# Patient Record
Sex: FEMALE | URBAN - METROPOLITAN AREA
[De-identification: names, ages, dates, MRNs, and addresses within clinical notes are randomized per-mention and may not be internally consistent; named-entity substitution may affect disease eponyms.]

---
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
## 2018-01-17 DIAGNOSIS — F31.9 BIPOLAR I DISORDER (H): ICD-10-CM

## 2018-01-17 RX ORDER — QUETIAPINE FUMARATE 50 MG/1
50 TABLET, EXTENDED RELEASE ORAL AT BEDTIME
Qty: 30 EACH | Refills: 0 | Status: SHIPPED | OUTPATIENT
Start: 2018-01-17 | End: 2018-01-19

## 2018-01-17 RX ORDER — QUETIAPINE 200 MG/1
200 TABLET, FILM COATED, EXTENDED RELEASE ORAL AT BEDTIME
Qty: 30 TABLET | Refills: 0 | Status: SHIPPED | OUTPATIENT
Start: 2018-01-17 | End: 2018-01-19

## 2018-01-17 NOTE — TELEPHONE ENCOUNTER
Miri Garner Michelle, RN        Phone Number: [phone]                     Caller:  Zoe   Medication:  200 mg and 50 mg Seroquel   Pharmacy and location:  [facility] in [place]   Have you contacted the pharmacy: yes   How much of medication do you have left:  out   Pending appt: 1/19   Okay to leave VM:  yes              Miri Mina Michelle, RN        Phone Number: [phone]                     Caller:  MotherChloe   Medication:  Seroquel 250 mg   Pharmacy and location:  [facility] [place]   Have you contacted the pharmacy: yes - pharmacy states they sent refill request on 1/13 and 1/16 with no answer   How much of medication do you have left:  Out since 1/13   Pending appt: 1/19   Okay to leave VM:  Yes - on voicemail only     Per patient's mother, patient has been taking 250 mg dose, not 200 mg.       Will notify provider that pt has been out of med since 1/13/18, for instructions on dosing.

## 2018-01-17 NOTE — TELEPHONE ENCOUNTER
Bk Kaur Michelle, RN        Phone Number: [phone]                     Pharmacy calling and location: [facility] [place]   Name of person calling: [place]   Medication: Quetiapine 50mg and 200mg ER   Reason: Checking on status     Pt has been out of medication for 2 days       Leopoldo Miri E  Catherine Guillermo, RN        Phone Number: [phone]                     Zoe Wilcox's mother is calling again to check the status.  She will expect a call back by 2 PM or will call again.  She is upset about the delay given the patient has been out of the medication.  She said that she will call again if she has not heard anything by 2 PM.       Jennifer Us Michelle, RN        Phone Number: [phone]                     Caller:  pt   Medication:  Seroquel 250mg   Pharmacy and location:  [facility] in [place]   Have you contacted the pharmacy: Yeah   How much of medication do you have left:  Pt is on third day without medication   Pending appt: 1/19/18 at 8:45am   Okay to leave VM:  [phone]; yes ok to lvm       As pt cancelled most recent appt, need provider authorization to refill.  Also there is a dosage discrepancy in last office note stating that Seroquel XR dose is 200 mg daily.  Lastly, pt reports being off med x 3 days, need provider direction in case titration is needed.    Text-paged Dr. Montiel

## 2018-01-17 NOTE — TELEPHONE ENCOUNTER
Quetipine 50 mg  Last reflled 12/11/17  Qty #30    Medication requested: Quetiapine 200 mg  Last refilled: 12/11/17  Qty: 30    Last seen: 9/1/17  RTC: 2 months  Cancel: 0  No-show: 0  Next appt: 1/19/18    Refill decision: Refill pended and routed to the provider for review/determination due to confusion re: appropriate dose, pt was to return in 1 month.   Discussed possibility of continuing to decrease patient's Seroquel dose, but patient states that she recently filled the 250 mg tabs and would like to stay on the current dose and consider decreasing the dose to 200 mg at our next visit in 1 month  1) PSYCHOTROPIC MEDICATIONS:  - Continue lithium 900 mg qhs  - Continue Seroquel 200 mg qhs

## 2018-01-17 NOTE — TELEPHONE ENCOUNTER
Writer rec'd immediate call back from Dr. Montiel who has refilled Seroquel at 250 mg QD.  States that pt has tolerated med well so can restart at full dose- 250 mg daily.      Writer returned call to pt.  Discussed that this medication refill went initially to refill team and they were unable to refill due to dose discrepancy in last office note and due to cancelled appt.  Writer also states wanted provider approval to have pt restart at 250 mg daily vs titrate, after several missed doses.  Pt agrees that she has tolerated med and plans to restart at 250 mg daily.  Confirms that she will make upcoming appt this week.  Denies any further questions.

## 2018-01-19 ENCOUNTER — OFFICE VISIT (OUTPATIENT)
Dept: PSYCHIATRY | Facility: CLINIC | Age: 18
End: 2018-01-19
Attending: PSYCHIATRY & NEUROLOGY
Payer: COMMERCIAL

## 2018-01-19 ENCOUNTER — TELEPHONE (OUTPATIENT)
Dept: PSYCHIATRY | Facility: CLINIC | Age: 18
End: 2018-01-19

## 2018-01-19 VITALS
DIASTOLIC BLOOD PRESSURE: 79 MMHG | BODY MASS INDEX: 24.98 KG/M2 | WEIGHT: 132.2 LBS | SYSTOLIC BLOOD PRESSURE: 129 MMHG | HEART RATE: 87 BPM

## 2018-01-19 DIAGNOSIS — F31.9 BIPOLAR I DISORDER (H): Primary | ICD-10-CM

## 2018-01-19 LAB
BASOPHILS # BLD AUTO: 0.1 10E9/L (ref 0–0.2)
BASOPHILS NFR BLD AUTO: 0.7 %
DIFFERENTIAL METHOD BLD: NORMAL
EOSINOPHIL # BLD AUTO: 0.2 10E9/L (ref 0–0.7)
EOSINOPHIL NFR BLD AUTO: 2.2 %
ERYTHROCYTE [DISTWIDTH] IN BLOOD BY AUTOMATED COUNT: 12.9 % (ref 10–15)
HCT VFR BLD AUTO: 37.7 % (ref 35–47)
HGB BLD-MCNC: 12.4 G/DL (ref 11.7–15.7)
IMM GRANULOCYTES # BLD: 0 10E9/L (ref 0–0.4)
IMM GRANULOCYTES NFR BLD: 0.2 %
LITHIUM SERPL-SCNC: 0.89 MMOL/L (ref 0.6–1.2)
LYMPHOCYTES # BLD AUTO: 3 10E9/L (ref 0.8–5.3)
LYMPHOCYTES NFR BLD AUTO: 33.8 %
MCH RBC QN AUTO: 29.9 PG (ref 26.5–33)
MCHC RBC AUTO-ENTMCNC: 32.9 G/DL (ref 31.5–36.5)
MCV RBC AUTO: 91 FL (ref 78–100)
MONOCYTES # BLD AUTO: 0.7 10E9/L (ref 0–1.3)
MONOCYTES NFR BLD AUTO: 8 %
NEUTROPHILS # BLD AUTO: 4.8 10E9/L (ref 1.6–8.3)
NEUTROPHILS NFR BLD AUTO: 55.1 %
NRBC # BLD AUTO: 0 10*3/UL
NRBC BLD AUTO-RTO: 0 /100
PLATELET # BLD AUTO: 360 10E9/L (ref 150–450)
RBC # BLD AUTO: 4.15 10E12/L (ref 3.8–5.2)
TSH SERPL DL<=0.005 MIU/L-ACNC: 2.68 MU/L (ref 0.4–4)
WBC # BLD AUTO: 8.8 10E9/L (ref 4–11)

## 2018-01-19 PROCEDURE — 85025 COMPLETE CBC W/AUTO DIFF WBC: CPT | Performed by: STUDENT IN AN ORGANIZED HEALTH CARE EDUCATION/TRAINING PROGRAM

## 2018-01-19 PROCEDURE — 36415 COLL VENOUS BLD VENIPUNCTURE: CPT | Performed by: STUDENT IN AN ORGANIZED HEALTH CARE EDUCATION/TRAINING PROGRAM

## 2018-01-19 PROCEDURE — 84443 ASSAY THYROID STIM HORMONE: CPT | Performed by: STUDENT IN AN ORGANIZED HEALTH CARE EDUCATION/TRAINING PROGRAM

## 2018-01-19 PROCEDURE — G0463 HOSPITAL OUTPT CLINIC VISIT: HCPCS | Mod: ZF

## 2018-01-19 PROCEDURE — 80178 ASSAY OF LITHIUM: CPT | Performed by: STUDENT IN AN ORGANIZED HEALTH CARE EDUCATION/TRAINING PROGRAM

## 2018-01-19 RX ORDER — LITHIUM CARBONATE 300 MG/1
900 CAPSULE ORAL AT BEDTIME
Qty: 90 CAPSULE | Refills: 2 | Status: SHIPPED | OUTPATIENT
Start: 2018-01-19 | End: 2018-04-17

## 2018-01-19 RX ORDER — QUETIAPINE 200 MG/1
200 TABLET, FILM COATED, EXTENDED RELEASE ORAL AT BEDTIME
Qty: 30 TABLET | Refills: 2 | Status: SHIPPED | OUTPATIENT
Start: 2018-01-19 | End: 2018-04-17

## 2018-01-19 ASSESSMENT — PAIN SCALES - GENERAL: PAINLEVEL: NO PAIN (0)

## 2018-01-19 ASSESSMENT — PATIENT HEALTH QUESTIONNAIRE - PHQ9: SUM OF ALL RESPONSES TO PHQ QUESTIONS 1-9: 0

## 2018-01-19 NOTE — NURSING NOTE
Chief Complaint   Patient presents with     Recheck Medication     Bipolar I disorder     Reviewed allergies, medications, pharmacy and smoking status.  Administered abuse screening questions     Obtained weight, pain level, blood pressure and heart rate

## 2018-01-19 NOTE — MR AVS SNAPSHOT
After Visit Summary   1/19/2018    Zoe Altman    MRN: [id number]           Patient Information     Date Of Birth          2000        Visit Information        Provider Department      1/19/2018 8:45 AM Lupis Montiel, DO Psychiatry Clinic        Today's Diagnoses     Bipolar I disorder (H)    -  1      Care Instructions    1) Take Seroquel 200 mg at bedtime  2) Continue lithium 900 mg qhs for now, I will contact you by phone about what your lithium level is and instruct you further on any adjustments  3) Get fasting cholesterol panel, CMP and HgA1c for blood sugar at any [place] location.  4) Return to clinic Friday, April 6th at 10:45 am.  Call if you need to reschedule    Thank you for coming to the PSYCHIATRY CLINIC.    Lab Testing:  If you had lab testing today and your results are reassuring or normal they will be mailed to you or sent through [employer] within 7 days.   If the lab tests need quick action we will call you with the results.  The phone number we will call with results is # [phone] (home) . If this is not the best number please call our clinic and change the number.    Medication Refills:  If you need any refills please call your pharmacy and they will contact us. Our fax number for refills is [phone]. Please allow three business for refill processing.   If you need to  your refill at a new pharmacy, please contact the new pharmacy directly. The new pharmacy will help you get your medications transferred.     Scheduling:  If you have any concerns about today's visit or wish to schedule another appointment please call our office during normal business hours [phone] (8-5:00 M-F)    Contact Us:  Please call [phone] during business hours (8-5:00 M-F).  If after clinic hours, or on the weekend, please call  [phone].    Financial Assistance [phone]  [facility] Billing [phone]  [place] Billing [phone]  Medical Records  [phone]      MENTAL HEALTH CRISIS NUMBERS:  [facility]:   [facility] - [phone]   Crisis Residence [facility] - [phone]   Walk-In Counseling Center [facility] - [phone]   COPE 24/7 [place] Mobile Team for Adults - [[phone]]; Child - [[phone]]     Crisis Connection - [phone]     [facility] - [phone]   Walk-in counseling [facility] - [phone]   Walk-in counseling [facility] - [phone]   Crisis Residence [facility] - [phone]   Urgent Care Adult Mental Health:   --Drop-in, 24/7 crisis line, and [facility] [place] Mobile Team [[phone]]    CRISIS TEXT LINE: Text [phone] from anywhere, anytime, any crisis 24/7;    OR SEE www.crisistextline.org     Poison Control Center - [phone]    CHILD: [facility] needs assessment team - [phone]     [facility] Lifeline - [phone]; or [place] Lifeline - [phone]    If you have a medical emergency please call 911or go to the nearest ER.                    _____________________________________________    Again thank you for choosing PSYCHIATRY CLINIC and please let us know how we can best partner with you to improve you and your family's health.  You may be receiving a survey in the mail regarding this appointment. We would love to have your feedback, both positive and negative, so please fill out the survey and return it using the provided envolpe. The survey is done by an external company, so your answers are anonymous.             Follow-ups after your visit        Your next 10 appointments already scheduled     Apr 06, 2018 10:45 AM CDT   First Episode Return with Lupis Montiel,    Psychiatry Clinic ([facility])    [address] [address] [facility] [address] [phone]              Who to contact     Please call your clinic at  [phone] to:    Ask questions about your health    Make or cancel appointments    Discuss your medicines    Learn about your test results    Speak to your doctor   If you have compliments or concerns about an experience at your clinic, or if you wish to file a complaint, please contact [facility] Patient Relations at [phone] or email us at [email or web address]         Additional Information About Your Visit        [employer] Information     [employer] is an electronic gateway that provides easy, online access to your medical records. With [employer], you can request a clinic appointment, read your test results, renew a prescription or communicate with your care team.     To sign up for [employer] visit the website at [email or web address]   You will be asked to enter the access code listed below, as well as some personal information. Please follow the directions to create your username and password.     Your access code is: [email or web address]  Expires: 2018 10:15 AM     Your access code will  in 90 days. If you need help or a new code, please contact your [facility] Clinic or call [phone] for assistance.        Care EveryWhere ID     This is your Care EveryWhere ID. This could be used by other organizations to access your [place] medical records  [id number]        Your Vitals Were     Pulse BMI (Body Mass Index)                87 24.98 kg/m2           Blood Pressure from Last 3 Encounters:   18 129/79   17 130/88   17 126/83    Weight from Last 3 Encounters:   18 60 kg (132 lb 3.2 oz) (60 %)*   17 57.8 kg (127 lb 6.4 oz) (53 %)*   17 56.4 kg (124 lb 6.4 oz) (48 %)*     * Growth percentiles are based on CDC 2-20 Years data.              We Performed the Following     CBC with Platelets Differential ([facility])     [place] level     TSH with free T4 reflex          Today's Medication Changes          These  changes are accurate as of: 1/19/18 10:15 AM.  If you have any questions, ask your nurse or doctor.               These medicines have changed or have updated prescriptions.        Dose/Directions    QUEtiapine 200 MG 24 hr tablet   Commonly known as:  SEROquel XR   This may have changed:    - additional instructions  - Another medication with the same name was removed. Continue taking this medication, and follow the directions you see here.   Used for:  Bipolar I disorder (H)   Changed by:  Lupis Montiel DO        Dose:  200 mg   Take 1 tablet (200 mg) by mouth At Bedtime   Quantity:  30 tablet   Refills:  2            Where to get your medicines      These medications were sent to [facility] [address] [place] [address]     Phone:  [phone]     lithium 300 MG capsule    QUEtiapine 200 MG 24 hr tablet                Primary Care Provider Office Phone # Fax #    Cortez FOLEY Cristian [phone]       [address] [place] [address]        Equal Access to Services     DOLROES CUMMINGS AH: Hadii benito ku hadasho Soomaali, waaxda luqadaha, qaybta kaalmada adeegyada, waxselina cortez hayld clay . So [facility] [phone].    ATENCIÓN: Si habla español, tiene a ashton disposición servicios gratuitos de asistencia lingüística. Llame al [phone].    We comply with applicable federal civil rights laws and [place] laws. We do not discriminate on the basis of race, color, national origin, age, disability, sex, sexual orientation, or gender identity.            Thank you!     Thank you for choosing PSYCHIATRY CLINIC  for your care. Our goal is always to provide you with excellent care. Hearing back from our patients is one way we can continue to improve our services. Please take a few minutes to complete the written survey that you may receive in the mail after your visit with us. Thank you!             Your Updated  Medication List - Protect others around you: Learn how to safely use, store and throw away your medicines at www.disposemymeds.org.          This list is accurate as of: 1/19/18 10:15 AM.  Always use your most recent med list.                   Brand Name Dispense Instructions for use Diagnosis    lithium 300 MG capsule     90 capsule    Take 3 capsules (900 mg) by mouth At Bedtime    Bipolar I disorder (H)       norgestimate-ethinyl estradiol 0.25-35 MG-MCG per tablet    ORTHO-CYCLEN, SPRINTEC     Take 1 tablet by mouth daily        OMEGA-3 FISH OIL PO      Take 1,000 mg by mouth daily        QUEtiapine 200 MG 24 hr tablet    SEROquel XR    30 tablet    Take 1 tablet (200 mg) by mouth At Bedtime    Bipolar I disorder (H)       WOMENS MULTIVITAMIN PLUS PO

## 2018-01-19 NOTE — PROGRESS NOTES
PSYCHIATRY CLINIC FIRST EPISODE PSYCHOSIS PROGRESS NOTE     INTERIM HISTORY:  Zoe Altman is a 17 year old female who was last seen in clinic on 9/1/2017 at which time her lithium was continued at 900 mg and Seroquel at 200 mg qhs.           The patient reports good treatment adherence.  History was provided by patient who was a good historian.  Since the last visit:  -Patient arrived to the appointment 15 minutes late today.  We spent time updating her telephone number in the Epic system.  -Patient reports that the semester went well overall and is starting classes again next week.  She states that her grades weren't as high as she would like, but plans to study harder this semester to improve them.  -States that her mood has been stable and feels that she is tolerating her medications well without side effects.  -She does report that she is currently taking Seroquel 250 mg qhs but would like to decrease to 200 mg qhs today.    -States that work is going well and that she is getting along well with her siblings at home.  -States that she eventually would like to save up money to be able to afford her own apartment.    PSYCH ROS:   HARRISON/HYPOMANIA:  reports-none;  DENIES- increased energy, decreased sleep need, increased activity and grandiosity  PSYCHOSIS:  reports-none;  DENIES- delusions, auditory hallucinations and visual hallucinations    RECENT SUBSTANCE USE:     ALCOHOL- none          TOBACCO- none          CAFFEINE- 2-3 caffeinated beverages a week             CANNABIS- none        OPIOIDS- none            NARCAN KIT- No     OTHER ILLICIT DRUGS- none      MEDICAL ROS:  Reports none.  Denies weight gain, sedation, akathisia, unusual movements, wt gain, polydipsia, polyphagia, Parkinsonian-type symptoms [shuffling gait, masked facies, stooped posture, speech change, writing change], excessive water intake, unexpalined fever and sialorrhea and polydipsia, polyuria, nocturia and wt gain    SOCIAL HISTORY                                     Social Engagement- currently casually dating  Living Situation- living at home with 3 of her 6 siblings and mom and step-dad    Children- 0  Financial Support- working.    Educational Functioning- graduated high school this year  Feels Safe at Home- Yes.     PSYCH and CD Critical Summary Points since July 2017           Initially started on Seroquel  mg qhs, Seroquel 50 mg TID PRN for psychosis/agitation, Ativan 0.5 mg qhs during her inpatient hospitalization at [facility] 6/21/16-7/19/2016.  Started lithium 5/30/17 and has tolerated well at dose of 900 mg qhs and has continued a cross taper of Seroquel from 400 mg to now 250 mg qhs    MEDICAL / SURGICAL HISTORY                                 Review of patient's allergies indicates no known allergies.    Patient Active Problem List   Diagnosis     Bipolar I disorder (H)       MEDICATIONS                               Current Outpatient Prescriptions   Medication Sig Dispense Refill     QUEtiapine (SEROQUEL XR) 200 MG 24 hr tablet Take 1 tablet (200 mg) by mouth At Bedtime (Take with 50 mg tab for total of 250 mg qhs) 30 tablet 0     QUEtiapine (SEROQUEL XR) 50 MG TB24 24 hr tablet Take 1 tablet (50 mg) by mouth At Bedtime (Take with 200 mg tab for total of 250 mg qhs) 30 each 0     lithium 300 MG capsule Take 3 capsules (900 mg) by mouth At Bedtime 90 capsule 3     norgestimate-ethinyl estradiol (ORTHO-CYCLEN, SPRINTEC) 0.25-35 MG-MCG per tablet Take 1 tablet by mouth daily       Omega-3 Fatty Acids (OMEGA-3 FISH OIL PO) Take 1,000 mg by mouth daily       Multiple Vitamins-Minerals (WOMENS MULTIVITAMIN PLUS PO)        VITALS   /79  Pulse 87  Wt 60 kg (132 lb 3.2 oz)  BMI 24.98 kg/m2      Wt Readings from Last 4 Encounters:   01/19/18 60 kg (132 lb 3.2 oz) (60 %)*   09/01/17 57.8 kg (127 lb 6.4 oz) (53 %)*   07/14/17 56.4 kg (124 lb 6.4 oz) (48 %)*   05/30/17 56.3 kg (124 lb 3.2 oz) (48 %)*     * Growth percentiles are  based on [facility] 2-20 Years data.       MENTAL STATUS EXAM                                                             Alertness: alert  and oriented  Appearance: well groomed  Behavior/Demeanor: cooperative, pleasant and calm, with good  eye contact   Speech: normal and regular rate and rhythm  Language: intact  Psychomotor: normal or unremarkable  Mood: description consistent with euthymia  Affect: appropriate; was congruent to mood; was congruent to content  Thought Process/Associations: unremarkable  Thought Content:  Reports none;  Denies suicidal ideation , violent ideation  and psychotic thoughts   Perception:  Reports none;  Denies auditory hallucinations and visual hallucinations  Insight: good  Judgment: good  Cognition: does  appear grossly intact; formal cognitive testing was not done    LABS and DATA     RATING SCALES:  AIMS: determine next due at next visit, last score 3/14/17: 0    PHQ9 TODAY = 0  PHQ-9 SCORE 4/25/2017 5/30/2017 7/14/2017   Total Score 0 0 0       LITHIUM LABS     [level, renal, SG, TSH, WBC]    q6 mo  Recent Labs   Lab Test  06/14/17   1137  06/13/17   0930  06/08/17   0812   LITHIUM  0.7  1.1  0.4*     Recent Labs   Lab Test  06/08/17   0812   CR  0.84   GFRESTIMATED  88   NA  140   DERIC  9.1     No lab results found.  Recent Labs   Lab Test  06/08/17   0812   TSH  2.26     Recent Labs   Lab Test  06/08/17   0812   WBC  7.8   ANEU  3.4       ANTIPSYCHOTIC LABS ROUTINE    [glu, A1C, lipids (focus LDL), liver enzymes, WBC, ANEU, Hgb, plts]   q12 mo  Recent Labs   Lab Test  06/08/17   0812   GLC  73   A1C  5.3     Recent Labs   Lab Test  06/08/17   0812   CHOL  245*   TRIG  94*   LDL  139*   HDL  87     Recent Labs   Lab Test  06/08/17   0812   AST  16   ALT  17   ALKPHOS  57     Recent Labs   Lab Test  06/08/17   0812   WBC  7.8   ANEU  3.4   HGB  14.3   PLT  313       DIAGNOSIS     Bipolar I Disorder  ASSESSMENT                                     TODAY   1) Bipolar Disorder- Patient  reports that she is tolerating the cross-taper from Seroquel to lithium well.  Her lithium level is within a therapeutic range and she feels that her symptoms are adequately controlled.  Patient is reportedly taking Seroquel 250 mg qhs and now plans to decrease it to 200 mg qhs.      2) Labs- Patient had elevated cholesterol, LDL, and triglycerides in 6/2017.  This may improve as patient tapers and possibly discontinue Seroquel.  Today patient will complete CBC, TSH, and lithium level.  Patient will complete fasting HgA1c, CMP, and cholesterol panel at next visit.  I will contact patient regarding lithium level and to discuss whether any dosing adjustments need to be made     PSYCHOTROPIC DRUG INTERACTIONS:   None.  MANAGEMENT:  N/A        PLAN                                                                                                       1) PSYCHOTROPIC MEDICATIONS:  - Decrease Seroquel from 250 mg to 200 mg qhs  - Continue lithium 900 mg qhs      2) THERAPY:  See plan below  TREATMENT PLAN: Date revised  -  1/19/18                                     Date next due- 4/19/18    3) NEXT DUE:    Labs- CBC, TSH, lithium level today.  HgA1c, CMP, and cholesterol fasting at any [place] location  Rating Scales- AIMS- next visit    4) REFERRALS:    No Referrals needed    5) RTC: 4/6/18    6) CRISIS NUMBERS:   Provided routinely in AVS.  Especially emphasized:  none    PSYCHIATRY CLINIC INDIVIDUAL PSYCHOTHERAPY NOTE   Length of session: Time started: 10:00 am Time ended: 10:17 am   continuing care in the Psych Clinic    Subjective: This supportive psychotherapy session addressed issues related to orientation to therapy , and school .  Patient's reaction: Maintenance  in the context of mental status appropriate for ambulatory setting.  Progress: good     Plan: Review in 3 months at next visit  See above    Psychotherapy services during this visit included  myself and Zoe Altman  TREATMENT  PLAN           SYMPTOMS;PROBLEMS   MEASURABLE GOALS;    FUNCTIONAL IMPROVEMENT INTERVENTIONS;    GAINS MADE DISCHARGE CRITERIA   Carrie/Hypomania: none   reduce manic/hypomanic episodes     Depression: insomnia   aiming for 8 hours of sleep     Psychosocial: saving money for independent housing   working part time and going to school     TREATMENT RISK STATEMENT:  The risks, benefits, alternatives and potential adverse effects have been discussed and are understood by the pt. The pt understands the risks of using street drugs or alcohol.  There are no medical contraindications, the pt agrees to treatment with the ability to do so.  The pt understands to call 911/ come to the nearest ED if life threatening or urgent symptoms present.        PROVIDER:  Lupis Montiel, DO    I did not see this pt directly. I have reviewed the documentation, and I agree with the resident's plan of care.    Deepali Fored

## 2018-01-19 NOTE — PATIENT INSTRUCTIONS
1) Take Seroquel 200 mg at bedtime  2) Continue lithium 900 mg qhs for now, I will contact you by phone about what your lithium level is and instruct you further on any adjustments  3) Get fasting cholesterol panel, CMP and HgA1c for blood sugar at any [place] location.  4) Return to clinic Friday, April 6th at 10:45 am.  Call if you need to reschedule    Thank you for coming to the PSYCHIATRY CLINIC.    Lab Testing:  If you had lab testing today and your results are reassuring or normal they will be mailed to you or sent through [employer] within 7 days.   If the lab tests need quick action we will call you with the results.  The phone number we will call with results is # [phone] (home) . If this is not the best number please call our clinic and change the number.    Medication Refills:  If you need any refills please call your pharmacy and they will contact us. Our fax number for refills is [phone]. Please allow three business for refill processing.   If you need to  your refill at a new pharmacy, please contact the new pharmacy directly. The new pharmacy will help you get your medications transferred.     Scheduling:  If you have any concerns about today's visit or wish to schedule another appointment please call our office during normal business hours [phone] (8-5:00 M-F)    Contact Us:  Please call [phone] during business hours (8-5:00 M-F).  If after clinic hours, or on the weekend, please call  [phone].    Financial Assistance [phone]  [facility] Billing [phone]  [facility] Billing [phone]  Medical Records [phone]      MENTAL HEALTH CRISIS NUMBERS:  [facility]:   [facility] - [phone]   Crisis Residence [facility] [place] [facility] - [phone]   Walk-In Counseling Center [facility] [place] - [phone]   COPE 24/7 [place] Mobile Team for Adults - [[phone]]; Child - [[phone]]     Crisis Connection - [phone]     [facility]:    [facility] - [phone]   Walk-in counseling [facility] - [phone]   Walk-in counseling [facility] - [phone]   Crisis Residence [facility] - [phone]   Urgent Care Adult Mental Health:   --Drop-in, 24/7 crisis line, and [facility] [place] Mobile Team [[phone]]    CRISIS TEXT LINE: Text [phone] from anywhere, anytime, any crisis 24/7;    OR SEE www.crisistextline.org     Poison Control Center - [phone]    CHILD: [facility] needs assessment team - [phone]     [facility] Lifeline - [phone]; or [facility] Lifeline - [phone]    If you have a medical emergency please call 911or go to the nearest ER.                    _____________________________________________    Again thank you for choosing PSYCHIATRY CLINIC and please let us know how we can best partner with you to improve you and your family's health.  You may be receiving a survey in the mail regarding this appointment. We would love to have your feedback, both positive and negative, so please fill out the survey and return it using the provided envolpe. The survey is done by an external company, so your answers are anonymous.

## 2018-01-19 NOTE — TELEPHONE ENCOUNTER
Called phone number listed in patient's chart at 9:00.  Patient had an appointment scheduled with me at 8:45 today but has not yet arrived.  Left a voicemail asking her to call clinic back

## 2018-01-24 ENCOUNTER — TELEPHONE (OUTPATIENT)
Dept: PSYCHIATRY | Facility: CLINIC | Age: 18
End: 2018-01-24

## 2018-01-24 NOTE — TELEPHONE ENCOUNTER
Contacted patient and left a voicemail to let her know that her lithium levels, CBC, and TSH were all within normal limits.  Instructed her to continue her current lithium dose 900 mg qhs.      Also, I will be on vacation on 4/6/17 and asked patient to reschedule for 4/27 and to contact clinic.

## 2018-03-21 ENCOUNTER — TELEPHONE (OUTPATIENT)
Dept: PSYCHIATRY | Facility: CLINIC | Age: 18
End: 2018-03-21

## 2018-04-17 ENCOUNTER — OFFICE VISIT (OUTPATIENT)
Dept: PSYCHIATRY | Facility: CLINIC | Age: 18
End: 2018-04-17
Attending: NURSE PRACTITIONER
Payer: COMMERCIAL

## 2018-04-17 VITALS
DIASTOLIC BLOOD PRESSURE: 71 MMHG | WEIGHT: 131.6 LBS | SYSTOLIC BLOOD PRESSURE: 125 MMHG | BODY MASS INDEX: 24.87 KG/M2 | HEART RATE: 76 BPM

## 2018-04-17 DIAGNOSIS — F31.9 BIPOLAR I DISORDER (H): ICD-10-CM

## 2018-04-17 PROCEDURE — G0463 HOSPITAL OUTPT CLINIC VISIT: HCPCS | Mod: ZF

## 2018-04-17 RX ORDER — LITHIUM CARBONATE 300 MG/1
900 CAPSULE ORAL AT BEDTIME
Qty: 90 CAPSULE | Refills: 3 | Status: SHIPPED | OUTPATIENT
Start: 2018-04-17 | End: 2018-06-13

## 2018-04-17 RX ORDER — QUETIAPINE 150 MG/1
150 TABLET, FILM COATED, EXTENDED RELEASE ORAL AT BEDTIME
Qty: 30 TABLET | Refills: 3 | Status: SHIPPED | OUTPATIENT
Start: 2018-04-17 | End: 2018-06-13

## 2018-04-17 ASSESSMENT — PAIN SCALES - GENERAL: PAINLEVEL: NO PAIN (0)

## 2018-04-17 NOTE — MR AVS SNAPSHOT
After Visit Summary   2018    Zoe Altman    MRN: [id number]           Patient Information     Date Of Birth          2000        Visit Information        Provider Department      2018 2:30 PM Chandra Herrear APRN [facility]        Today's Diagnoses     Bipolar I disorder (H)          Care Instructions    Medication change:    Seroquel decreased from 200mg at bedtime to 150mg.  Will call clinic if does not sleep for more than one night.      Blood work needed:    Hemoglobin A1C  Lipid Profile  Comprehensive Metabolic Panel               Follow-ups after your visit        Follow-up notes from your care team     Return in about 2 months (around 2018).      Who to contact     Please call your clinic at [phone] to:    Ask questions about your health    Make or cancel appointments    Discuss your medicines    Learn about your test results    Speak to your doctor            Additional Information About Your Visit        MyChart Information     AdultSpace is an electronic gateway that provides easy, online access to your medical records. With Adult[employer], you can request a clinic appointment, read your test results, renew a prescription or communicate with your care team.     To sign up for Story[employer] visit the website at [email or web address]   You will be asked to enter the access code listed below, as well as some personal information. Please follow the directions to create your username and password.     Your access code is: [email or web address]  Expires: 2018 11:15 AM     Your access code will  in 90 days. If you need help or a new code, please contact your [facility] or call [phone] for assistance.        Care EveryWhere ID     This is your Care EveryWhere ID. This could be used by other organizations to access your [place] medical records  [id number]        Your Vitals Were     Pulse BMI (Body Mass Index)                76  24.87 kg/m2           Blood Pressure from Last 3 Encounters:   04/17/18 125/71   01/19/18 129/79   09/01/17 130/88    Weight from Last 3 Encounters:   04/17/18 59.7 kg (131 lb 9.6 oz) (58 %)*   01/19/18 60 kg (132 lb 3.2 oz) (60 %)*   09/01/17 57.8 kg (127 lb 6.4 oz) (53 %)*     * Growth percentiles are based on [facility] 2-20 Years data.              Today, you had the following     No orders found for display         Today's Medication Changes          These changes are accurate as of 4/17/18  3:05 PM.  If you have any questions, ask your nurse or doctor.               These medicines have changed or have updated prescriptions.        Dose/Directions    QUEtiapine 150 MG Tb24 24 hr tablet   Commonly known as:  SEROquel XR   This may have changed:    - medication strength  - how much to take   Used for:  Bipolar I disorder (H)   Changed by:  Chandra Herrera APRN CNP        Dose:  150 mg   Take 1 tablet (150 mg) by mouth At Bedtime   Quantity:  30 tablet   Refills:  3            Where to get your medicines      These medications were sent to [facility] [address] [place] [id number]     Phone:  [phone]     lithium 300 MG capsule    QUEtiapine 150 MG Tb24 24 hr tablet                Primary Care Provider Office Phone # Fax #    Cortez FOLEY Cristian [phone]       [address] [place] [address]        Equal Access to Services     LAWRENCE CUMMINGS AH: Magdy ivy Soobed, waaxda luqadaha, qaybta kaalmada adeegyaelieser, kiersten lindsey. So [facility] [phone].    ATENCIÓN: Si habla español, tiene a ashton disposición servicios gratuitos de asistencia lingüística. Llame al [phone].    We comply with applicable federal civil rights laws and [place] laws. We do not discriminate on the basis of race, color, national origin, age, disability, sex, sexual orientation, or gender identity.             Thank you!     Thank you for choosing [facility]  for your care. Our goal is always to provide you with excellent care. Hearing back from our patients is one way we can continue to improve our services. Please take a few minutes to complete the written survey that you may receive in the mail after your visit with us. Thank you!             Your Updated Medication List - Protect others around you: Learn how to safely use, store and throw away your medicines at www.disposemymeds.org.          This list is accurate as of 4/17/18  3:05 PM.  Always use your most recent med list.                   Brand Name Dispense Instructions for use Diagnosis    lithium 300 MG capsule     90 capsule    Take 3 capsules (900 mg) by mouth At Bedtime    Bipolar I disorder (H)       norgestimate-ethinyl estradiol 0.25-35 MG-MCG per tablet    ORTHO-CYCLEN, SPRINTEC     Take 1 tablet by mouth daily        OMEGA-3 FISH OIL PO      Take 1,000 mg by mouth daily        QUEtiapine 150 MG Tb24 24 hr tablet    SEROquel XR    30 tablet    Take 1 tablet (150 mg) by mouth At Bedtime    Bipolar I disorder (H)       WOMENS MULTIVITAMIN PLUS PO

## 2018-04-17 NOTE — PROGRESS NOTES
Psychiatry Clinic Progress Note                                                                   Zoe Altman is a 17 year old female who returns to the clinic for follow-up care.  Last seen on 1/19/18 by Dr. YVETTE Montiel.  It was reported that she was doing well in school and at work.  Her mental health was stable and Zoe was successfully cross-tapering from Seroquel to [place].  Therapist: None  PCP: Cortez Foster  Other Providers: None    Pertinent Background:  See previous notes.  Psych critical item history includes psychosis [sxs include disorganized thought process, auditory and visual hallucinations, and paranoia in context of shanel] and psych hosp (<3).     Interim History                                                                                                        4, 4     The patient is a good historian, reports good treatment adherence and was last seen 1/19/18.  Since the last visit,  Zoe does not report any changes in her mental health.  She does not report any concerns with elevated mood, increased energy, and decreased need for sleep.  In February, Zoe had difficulty getting her medications filled at pharmacy and experienced difficulty falling asleep.  She went a total of 3 days without medications.  She continues to live at home in [place], [place].  Continue to get along with her siblings.  Zoe reports she continues to do well in school.  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  Zoe continues to work at [employer].      Zoe continues to want to taper off Seroquel due to increased sedation.  Her only concern today is sleeping too much.   No concerns with side effects.  No observable tremors or uncontrolled movements.      Seroquel decreased to 150mg today.  Zoe states her first sign of symptom relapse is lack of sleep.  She will call clinic if she does not sleep for more than a night.      Requested to return to  clinic in early June    Recent Symptoms:   Depression:  not reported  Elevated:  none  Psychosis:  none  Anxiety:  not reported  Panic Attack:  none  Trauma Related:  none     Recent Substance Use:  no changes reported        Social/ Family History                                  [per patient report]                                 1ea,1ea   FINANCIAL SUPPORT- working       LIVING SITUATION- Lives at home with family      FEELS SAFE AT HOME- Yes    Medical / Surgical History                                                                                                                  Patient Active Problem List   Diagnosis     Bipolar I disorder (H)       No past surgical history on file.     Medical Review of Systems                                                                                                    2,10   A comprehensive review of systems was performed and is negative other than noted in the HPI.  Pregnant- No    Breastfeeding- No    Contraception- Yes  Allergy                                Review of patient's allergies indicates no known allergies.  Current Medications                                                                                                       Current Outpatient Prescriptions   Medication Sig Dispense Refill     QUEtiapine (SEROQUEL XR) 200 MG 24 hr tablet Take 1 tablet (200 mg) by mouth At Bedtime 30 tablet 2     lithium 300 MG capsule Take 3 capsules (900 mg) by mouth At Bedtime 90 capsule 2     norgestimate-ethinyl estradiol (ORTHO-CYCLEN, SPRINTEC) 0.25-35 MG-MCG per tablet Take 1 tablet by mouth daily       Omega-3 Fatty Acids (OMEGA-3 FISH OIL PO) Take 1,000 mg by mouth daily       Multiple Vitamins-Minerals (WOMENS MULTIVITAMIN PLUS PO)        Vitals                                                                                                                       3, 3   /71  Pulse 76  Wt 59.7 kg (131 lb 9.6 oz)  BMI 24.87 kg/m2   Mental Status Exam                                                                                     9, 14 cog gs     Alertness: alert  and oriented  Appearance: casually groomed  Behavior/Demeanor: cooperative, pleasant and calm, with good  eye contact   Speech: normal and regular rate and rhythm  Language: intact  Psychomotor: normal or unremarkable  Mood: description consistent with euthymia  Affect: full range and appropriate; was congruent to mood; was congruent to content  Thought Process/Associations: unremarkable  Thought Content:  Reports none;  Denies suicidal ideation and violent ideation  Perception:  Reports none;  Denies auditory hallucinations and visual hallucinations  Insight: good  Judgment: good  Cognition: (6) does  appear grossly intact; formal cognitive testing was not done  Gait/Station and/or Muscle Strength/Tone: unremarkable    Labs and Data                                                                                                                 Rating Scales:    PHQ9    PHQ9 Today:  2  PHQ-9 SCORE 5/30/2017 7/14/2017 1/19/2018   Total Score 0 0 0         Diagnosis and Assessment                                                                             m2, h3     Today the following issues were addressed:    1) Bipolar I Disorder    [place] Prescription Monitoring Program [] was not checked today:  not using controlled substances.    PSYCHOTROPIC DRUG INTERACTIONS: none clinically relevant    Plan                                                                                                                    m2, h3      1) Medication Management  Continue Lithium 900mg qHS  Decrease Seroquel from 200mg to 150mg qHS due to sedation.      Lithium labs due in July 2018    Lipid profile, A1C, and CMP ordered 1/19/18.  Patient reminded of orders.      RTC: 2 months    CRISIS NUMBERS:   Provided routinely in AVS.    Treatment Risk Statement:  The patient understands the risks, benefits, adverse effects and  alternatives. Agrees to treatment with the capacity to do so. No medical contraindications to treatment. Agrees to call clinic for any problems. The patient understands to call 911 or go to the nearest ED if life threatening or urgent symptoms occur.     PROVIDER:  ANGIE Cutler CNP

## 2018-04-17 NOTE — PATIENT INSTRUCTIONS
Medication change:    Seroquel decreased from 200mg at bedtime to 150mg.  Will call clinic if does not sleep for more than one night.      Blood work needed:    Hemoglobin A1C  Lipid Profile  Comprehensive Metabolic Panel

## 2018-05-15 ASSESSMENT — PATIENT HEALTH QUESTIONNAIRE - PHQ9: SUM OF ALL RESPONSES TO PHQ QUESTIONS 1-9: 2

## 2018-06-13 ENCOUNTER — OFFICE VISIT (OUTPATIENT)
Dept: PSYCHIATRY | Facility: CLINIC | Age: 18
End: 2018-06-13
Attending: NURSE PRACTITIONER
Payer: COMMERCIAL

## 2018-06-13 VITALS
WEIGHT: 132.6 LBS | SYSTOLIC BLOOD PRESSURE: 128 MMHG | DIASTOLIC BLOOD PRESSURE: 76 MMHG | BODY MASS INDEX: 25.05 KG/M2 | HEART RATE: 95 BPM

## 2018-06-13 DIAGNOSIS — F31.9 BIPOLAR I DISORDER (H): ICD-10-CM

## 2018-06-13 PROCEDURE — G0463 HOSPITAL OUTPT CLINIC VISIT: HCPCS | Mod: ZF

## 2018-06-13 RX ORDER — QUETIAPINE FUMARATE 50 MG/1
100 TABLET, EXTENDED RELEASE ORAL AT BEDTIME
Qty: 60 TABLET | Refills: 3 | Status: SHIPPED | OUTPATIENT
Start: 2018-06-13 | End: 2018-10-23

## 2018-06-13 RX ORDER — LITHIUM CARBONATE 300 MG/1
900 CAPSULE ORAL AT BEDTIME
Qty: 90 CAPSULE | Refills: 3 | Status: SHIPPED | OUTPATIENT
Start: 2018-06-13 | End: 2018-10-23

## 2018-06-13 ASSESSMENT — PAIN SCALES - GENERAL: PAINLEVEL: NO PAIN (0)

## 2018-06-13 NOTE — PROGRESS NOTES
Psychiatry Clinic Progress Note                                                                   Zoe Altman is a 17 year old female who returns to the clinic for follow-up care.  Last seen on 1/19/18 by Dr. YVETTE Montiel.  It was reported that she was doing well in school and at work.  Her mental health was stable and Zoe was successfully cross-tapering from Seroquel to [place].  Therapist: None  PCP: Cortez Foster  Other Providers: None    Pertinent Background:  See previous notes.  Psych critical item history includes psychosis [sxs include disorganized thought process, auditory and visual hallucinations, and paranoia in context of shanel] and psych hosp (<3).     Interim History                                                                                                        4, 4     The patient is a good historian, reports good treatment adherence and was last seen 4/17/18.  Since the last visit, Zoe successfully completed her freshman year of college.  No concerns with her mental health.  Again, no concerns with elevated mood, increased energy, or decreased need for sleep.  She is sleeping regularly.  She does report daytime sedation and requests her Seroquel dose be lowered.  Zoe reports she knows the symptoms of shanel and will call the clinic if she experiences any symptoms.  Provided education on proper hydration and how excessive due to summer temperatures may impact Lithium levels.      4/17/18:  Zoe does not report any changes in her mental health.  She does not report any concerns with elevated mood, increased energy, and decreased need for sleep.  In February, Zoe had difficulty getting her medications filled at pharmacy and experienced difficulty falling asleep.  She went a total of 3 days without medications.  She continues to live at home in [place], [place].  Continue to get along with her siblings.  Zoe reports she continues to do well in school.  Her focus is  currently undecided but possibly business or elementary school teaching.  Zoe continues to work at [employer].      Zoe continues to want to taper off Seroquel due to increased sedation.  Her only concern today is sleeping too much.   No concerns with side effects.  No observable tremors or uncontrolled movements.      Seroquel decreased to 150mg today.  Zoe states her first sign of symptom relapse is lack of sleep.  She will call clinic if she does not sleep for more than a night.       Requested to return to clinic in early June    Recent Symptoms:   Depression:  not reported  Elevated:  none  Psychosis:  none  Anxiety:  not reported  Panic Attack:  none  Trauma Related:  none     Recent Substance Use:  no changes reported        Social/ Family History                                  [per patient report]                                 1ea,1ea   FINANCIAL SUPPORT- working       LIVING SITUATION- Lives at home with family      FEELS SAFE AT HOME- Yes    Medical / Surgical History                                                                                                                  Patient Active Problem List   Diagnosis     Bipolar I disorder (H)       No past surgical history on file.     Medical Review of Systems                                                                                                    2,10   A comprehensive review of systems was performed and is negative other than noted in the HPI.  Pregnant- No    Breastfeeding- No    Contraception- Yes  Allergy                                Review of patient's allergies indicates no known allergies.  Current Medications                                                                                                       Current Outpatient Prescriptions   Medication Sig Dispense Refill     lithium 300 MG capsule Take 3 capsules (900 mg) by mouth At Bedtime 90 capsule 3     Multiple Vitamins-Minerals (WOMENS MULTIVITAMIN PLUS  PO)        norgestimate-ethinyl estradiol (ORTHO-CYCLEN, SPRINTEC) 0.25-35 MG-MCG per tablet Take 1 tablet by mouth daily       QUEtiapine (SEROQUEL XR) 150 MG TB24 24 hr tablet Take 1 tablet (150 mg) by mouth At Bedtime 30 tablet 3     Omega-3 Fatty Acids (OMEGA-3 FISH OIL PO) Take 1,000 mg by mouth daily       Vitals                                                                                                                       3, 3   /76  Pulse 95  Wt 60.1 kg (132 lb 9.6 oz)  BMI 25.05 kg/m2   Mental Status Exam                                                                                    9, 14 cog gs     Alertness: alert  and oriented  Appearance: casually groomed  Behavior/Demeanor: cooperative, pleasant and calm, with good  eye contact   Speech: normal  Language: intact  Psychomotor: normal or unremarkable  Mood: description consistent with euthymia  Affect: full range; was congruent to mood; was congruent to content  Thought Process/Associations: unremarkable  Thought Content:  Reports none;  Denies suicidal ideation and violent ideation  Perception:  Reports none;  Denies auditory hallucinations and visual hallucinations  Insight: good  Judgment: good  Cognition: (6) does  appear grossly intact; formal cognitive testing was not done  Gait/Station and/or Muscle Strength/Tone: unremarkable    Labs and Data                                                                                                                 Rating Scales:    PHQ9    PHQ9 Today:  0  PHQ-9 SCORE 7/14/2017 1/19/2018 4/17/2018   Total Score 0 0 2         Diagnosis and Assessment                                                                             m2, h3     Today the following issues were addressed:    1) Bipolar I Disorder    [place] Prescription Monitoring [place] [] was not checked today:  not using controlled substances.    PSYCHOTROPIC DRUG INTERACTIONS: none clinically relevant    Plan                                                                                                                     m2, h3      1) Medication Management  Continue Lithium 900mg qHS  Decrease Seroquel from 150mg to 100mg qHS due to continued sedation.      Lithium labs due in July 2018    Lipid profile, A1C, and CMP ordered 1/19/18.  Patient reminded of orders.      RTC: 2 months    CRISIS NUMBERS:   Provided routinely in AVS.    Treatment Risk Statement:  The patient understands the risks, benefits, adverse effects and alternatives. Agrees to treatment with the capacity to do so. No medical contraindications to treatment. Agrees to call clinic for any problems. The patient understands to call 911 or go to the nearest ED if life threatening or urgent symptoms occur.     PROVIDER:  ANGIE Cutler CNP

## 2018-06-13 NOTE — MR AVS SNAPSHOT
After Visit Summary   6/13/2018    Zoe Altman    MRN: [id number]           Patient Information     Date Of Birth          2000        Visit Information        Provider Department      6/13/2018 12:30 PM Chandra Herrera APRN [facility]        Today's Diagnoses     Bipolar I disorder (H)          Care Instructions    Thank you for coming to the PSYCHIATRY CLINIC.    Lab Testing:  If you had lab testing today and your results are reassuring or normal they will be mailed to you or sent through [employer] within 7 days.   If the lab tests need quick action we will call you with the results.  The phone number we will call with results is # [phone] (home) . If this is not the best number please call our clinic and change the number.    Medication Refills:  If you need any refills please call your pharmacy and they will contact us. Our fax number for refills is [phone]. Please allow three business for refill processing.   If you need to  your refill at a new pharmacy, please contact the new pharmacy directly. The new pharmacy will help you get your medications transferred.     Scheduling:  If you have any concerns about today's visit or wish to schedule another appointment please call our office during normal business hours [phone] (8-5:00 M-F)    Contact Us:  Please call [phone] during business hours (8-5:00 M-F).  If after clinic hours, or on the weekend, please call  [phone].    Financial Assistance [phone]  [facility] Billing [phone]  [facility] Billing [phone]  Medical Records [phone]      MENTAL HEALTH CRISIS NUMBERS:  [facility]:   [facility] - [phone]   Crisis Residence [facility] - [phone]   Walk-In Counseling Center [facility] - [phone]   COPE 24/7 [place] Mobile Team for Adults - [[phone]]; Child - [[phone]]     Crisis Connection - [phone]     [facility]:   [facility]  [facility] - [phone]   Walk-in counseling [facility] - [phone]   Walk-in counseling [facility] - [phone]   Crisis Residence [facility] [place] [facility] - [phone]   Urgent Care Adult Mental Health:   --Drop-in, 24/7 crisis line, and [facility] Mobile Team [[phone]]    CRISIS TEXT LINE: Text [phone] from anywhere, anytime, any crisis 24/7;    OR SEE www.crisistextline.org     Poison Control Center - [phone]    CHILD: [facility] needs assessment team - [phone]     [facility] Lifeline - [phone]; or [employer] | [employer] Lifeline - [phone]    If you have a medical emergency please call 911or go to the nearest ER.                    _____________________________________________    Again thank you for choosing PSYCHIATRY CLINIC and please let us know how we can best partner with you to improve you and your family's health.  You may be receiving a survey in the mail regarding this appointment. We would love to have your feedback, both positive and negative, so please fill out the survey and return it using the provided envelope. The survey is done by an external company, so your answers are anonymous.             Follow-ups after your visit        Your next 10 appointments already scheduled     Aug 15, 2018 12:30 PM CDT   Adult Med Follow UP with ANGIE Reynolds [facility]   Psychiatry Clinic ([facility])    [address] [address] [phone]              Who to contact     Please call your clinic at [phone] to:    Ask questions about your health    Make or cancel appointments    Discuss your medicines    Learn about your test results    Speak to your doctor            Additional Information About Your Visit        Care EveryWhere ID     This is your Care EveryWhere ID. This could be used by other organizations to access your [facility]  records  [id number]        Your Vitals Were     Pulse BMI (Body Mass Index)                95 25.05 kg/m2           Blood Pressure from Last 3 Encounters:   06/13/18 128/76   04/17/18 125/71   01/19/18 129/79    Weight from Last 3 Encounters:   06/13/18 60.1 kg (132 lb 9.6 oz) (59 %)*   04/17/18 59.7 kg (131 lb 9.6 oz) (58 %)*   01/19/18 60 kg (132 lb 3.2 oz) (60 %)*     * Growth percentiles are based on [facility] 2-20 Years data.              Today, you had the following     No orders found for display         Today's Medication Changes          These changes are accurate as of 6/13/18 12:51 PM.  If you have any questions, ask your nurse or doctor.               These medicines have changed or have updated prescriptions.        Dose/Directions    QUEtiapine 50 MG Tb24 24 hr tablet   Commonly known as:  SEROquel XR   This may have changed:    - medication strength  - how much to take   Used for:  Bipolar I disorder (H)   Changed by:  Chandra Herrera APRN CNP        Dose:  100 mg   Take 2 tablets (100 mg) by mouth At Bedtime   Quantity:  60 tablet   Refills:  3            Where to get your medicines      These medications were sent to [facility] [address] [place] [address]  [address] [facility] [address]     Phone:  [phone]     lithium 300 MG capsule    QUEtiapine 50 MG Tb24 24 hr tablet                Primary Care Provider Office Phone # Fax #    Cortez Foster [phone]       [facility] [address] [place] [address] [address] [place] [address]        Equal Access to Services     [facility] ZOILA AH: Hadbrandt Cronin, roverto lopez, qaybdain kaalkiersten blackwell. So [facility] [phone].    ATENCIÓN: Si habla español, tiene a ashton disposición servicios gratuitos de asistencia lingüística. Juan al [phone].    We comply with applicable federal civil rights laws and [place] laws. We do not discriminate on the basis of  race, color, national origin, age, disability, sex, sexual orientation, or gender identity.            Thank you!     Thank you for choosing PSYCHIATRY CLINIC  for your care. Our goal is always to provide you with excellent care. Hearing back from our patients is one way we can continue to improve our services. Please take a few minutes to complete the written survey that you may receive in the mail after your visit with us. Thank you!             Your Updated Medication List - Protect others around you: Learn how to safely use, store and throw away your medicines at www.disposemymeds.org.          This list is accurate as of 6/13/18 12:51 PM.  Always use your most recent med list.                   Brand Name Dispense Instructions for use Diagnosis    lithium 300 MG capsule     90 capsule    Take 3 capsules (900 mg) by mouth At Bedtime    Bipolar I disorder (H)       norgestimate-ethinyl estradiol 0.25-35 MG-MCG per tablet    ORTHO-CYCLEN, SPRINTEC     Take 1 tablet by mouth daily        OMEGA-3 FISH OIL PO      Take 1,000 mg by mouth daily        QUEtiapine 50 MG Tb24 24 hr tablet    SEROquel XR    60 tablet    Take 2 tablets (100 mg) by mouth At Bedtime    Bipolar I disorder (H)       WOMENS MULTIVITAMIN PLUS PO

## 2018-06-13 NOTE — PATIENT INSTRUCTIONS
Thank you for coming to the PSYCHIATRY CLINIC.    Lab Testing:  If you had lab testing today and your results are reassuring or normal they will be mailed to you or sent through [employer] within 7 days.   If the lab tests need quick action we will call you with the results.  The phone number we will call with results is # [phone] (home) . If this is not the best number please call our clinic and change the number.    Medication Refills:  If you need any refills please call your pharmacy and they will contact us. Our fax number for refills is [phone]. Please allow three business for refill processing.   If you need to  your refill at a new pharmacy, please contact the new pharmacy directly. The new pharmacy will help you get your medications transferred.     Scheduling:  If you have any concerns about today's visit or wish to schedule another appointment please call our office during normal business hours [phone] (8-5:00 M-F)    Contact Us:  Please call [phone] during business hours (8-5:00 M-F).  If after clinic hours, or on the weekend, please call  [phone].    Financial Assistance [phone]  [employer] Billing [phone]  [employer] Billing [phone]  Medical Records [phone]      MENTAL HEALTH CRISIS NUMBERS:  [facility]:   [facility] - [phone]   Crisis Residence [facility] - [phone]   Walk-In Counseling [facility] [phone]   COPE 24/7 [place] Mobile Team for Adults - [[phone]]; Child - [[phone]]     Crisis Connection - [phone]     [facility]:   [facility] - [phone]   Walk-in counseling [facility] - [phone]   Walk-in counseling [facility] - [phone]   Crisis Residence [facility] - [phone]   Urgent Care Adult Mental Health:   --Drop-in, 24/7 crisis line, and [place] Mobile Team [[phone]]    CRISIS TEXT  LINE: Text [phone] from anywhere, anytime, any crisis 24/7;    OR SEE www.crisistextline.org     Poison Control Center - [phone]    CHILD: [facility] needs assessment team - [phone]     [facility] - [phone]; or [employer] Lifeline - [phone]    If you have a medical emergency please call 911or go to the nearest ER.                    _____________________________________________    Again thank you for choosing PSYCHIATRY CLINIC and please let us know how we can best partner with you to improve you and your family's health.  You may be receiving a survey in the mail regarding this appointment. We would love to have your feedback, both positive and negative, so please fill out the survey and return it using the provided envelope. The survey is done by an external company, so your answers are anonymous.

## 2018-06-19 ASSESSMENT — PATIENT HEALTH QUESTIONNAIRE - PHQ9: SUM OF ALL RESPONSES TO PHQ QUESTIONS 1-9: 0

## 2018-10-23 DIAGNOSIS — F31.9 BIPOLAR I DISORDER (H): ICD-10-CM

## 2018-10-23 NOTE — TELEPHONE ENCOUNTER
Medication requested: lithium 300 MG capsule     Last refilled: 9/22/18  Qty: 90    Medication requested: QUEtiapine (SEROQUEL XR) 50 MG TB24  Last refilled: 9/25/18  Qty: 60        Last seen: 6/13/18  RTC: 2 months  Cancel: 0  No-show: 1  Next appt: not scheduled    Refill decision:   Refill pended and routed to the provider for review/determination due to no show x 1  Pt outside of RTC timeframe.  Scheduling has been notified to contact the pt for appointment.

## 2018-10-24 RX ORDER — QUETIAPINE FUMARATE 50 MG/1
100 TABLET, EXTENDED RELEASE ORAL AT BEDTIME
Qty: 4 TABLET | Refills: 0 | Status: SHIPPED | OUTPATIENT
Start: 2018-10-24 | End: 2018-10-26

## 2018-10-24 RX ORDER — LITHIUM CARBONATE 300 MG/1
900 CAPSULE ORAL AT BEDTIME
Qty: 9 CAPSULE | Refills: 0 | Status: SHIPPED | OUTPATIENT
Start: 2018-10-24 | End: 2018-10-26

## 2018-10-26 ENCOUNTER — OFFICE VISIT (OUTPATIENT)
Dept: PSYCHIATRY | Facility: CLINIC | Age: 18
End: 2018-10-26
Attending: NURSE PRACTITIONER
Payer: COMMERCIAL

## 2018-10-26 VITALS
BODY MASS INDEX: 24.6 KG/M2 | WEIGHT: 130.2 LBS | DIASTOLIC BLOOD PRESSURE: 80 MMHG | HEART RATE: 75 BPM | SYSTOLIC BLOOD PRESSURE: 130 MMHG

## 2018-10-26 DIAGNOSIS — F31.9 BIPOLAR I DISORDER (H): Primary | ICD-10-CM

## 2018-10-26 PROCEDURE — G0463 HOSPITAL OUTPT CLINIC VISIT: HCPCS | Mod: ZF

## 2018-10-26 RX ORDER — LITHIUM CARBONATE 300 MG/1
900 CAPSULE ORAL AT BEDTIME
Qty: 270 CAPSULE | Refills: 1 | Status: SHIPPED | OUTPATIENT
Start: 2018-10-26 | End: 2019-03-26

## 2018-10-26 RX ORDER — QUETIAPINE FUMARATE 50 MG/1
100 TABLET, EXTENDED RELEASE ORAL AT BEDTIME
Qty: 180 TABLET | Refills: 1 | Status: SHIPPED | OUTPATIENT
Start: 2018-10-26 | End: 2019-03-26

## 2018-10-26 ASSESSMENT — PAIN SCALES - GENERAL: PAINLEVEL: NO PAIN (0)

## 2018-10-26 NOTE — MR AVS SNAPSHOT
After Visit Summary   10/26/2018    Zoe Altman    MRN: [id number]           Patient Information     Date Of Birth          2000        Visit Information        Provider Department      10/26/2018 12:30 PM Chandra Herrera APRN [facility]        Today's Diagnoses     Bipolar I disorder (H)    -  1      Care Instructions    Thank you for coming to the PSYCHIATRY CLINIC.    Lab Testing:  If you had lab testing today and your results are reassuring or normal they will be mailed to you or sent through [employer] within 7 days.   If the lab tests need quick action we will call you with the results.  The phone number we will call with results is # [phone] (home) . If this is not the best number please call our clinic and change the number.    Medication Refills:  If you need any refills please call your pharmacy and they will contact us. Our fax number for refills is [phone]. Please allow three business for refill processing.   If you need to  your refill at a new pharmacy, please contact the new pharmacy directly. The new pharmacy will help you get your medications transferred.     Scheduling:  If you have any concerns about today's visit or wish to schedule another appointment please call our office during normal business hours [phone] (8-5:00 M-F)    Contact Us:  Please call [phone] during business hours (8-5:00 M-F).  If after clinic hours, or on the weekend, please call  [phone].    Financial Assistance [phone]  [facility] Billing [phone]  [facility] Billing [phone]  Medical Records [phone]      MENTAL HEALTH CRISIS NUMBERS:  [facility]:   [facility] - [phone]   Crisis Residence [facility] - [phone]   Walk-In Counseling Center [facility] - [phone]   COPE 24/7 [place] Mobile Team for Adults - [[phone]]; Child - [[phone]]     Crisis Connection - [phone]     [facility]:    [facility] - [phone]   Walk-in counseling [facility] - [phone]   Walk-in counseling [facility] Family [facility] - [phone]   Crisis Residence [facility] [place] [facility] - [phone]   Urgent Care Adult Mental Health:   --Drop-in, 24/7 crisis line, and [facility] Mobile Team [[phone]]    CRISIS TEXT LINE: Text [phone] from anywhere, anytime, any crisis 24/7;    OR SEE www.crisistextline.org     Poison Control Center - [phone]    CHILD: [facility] needs assessment team - [phone]     [facility] Lifeline - [phone]; or [employer] Lifeline - [phone]    If you have a medical emergency please call 911or go to the nearest ER.                    _____________________________________________    Again thank you for choosing PSYCHIATRY CLINIC and please let us know how we can best partner with you to improve you and your family's health.  You may be receiving a survey in the mail regarding this appointment. We would love to have your feedback, both positive and negative, so please fill out the survey and return it using the provided envelope. The survey is done by an external company, so your answers are anonymous.             Follow-ups after your visit        Your next 10 appointments already scheduled     Apr 19, 2019 12:30 PM CDT   Adult Med Follow UP with ANGIE Reynolds CNP   Psychiatry Clinic ([facility])    [address]  [address] [address] [phone]              Future tests that were ordered for you today     Open Future Orders        Priority Expected Expires Ordered    TSH with free T4 reflex Routine  10/26/2019 10/26/2018    [place] Level Routine  10/26/2019 10/26/2018            Who to contact     Please call your clinic at [phone] to:    Ask questions about your health    Make or cancel appointments    Discuss your medicines    Learn about your test  results    Speak to your doctor            Additional Information About Your Visit        Care EveryWhere ID     This is your Care EveryWhere ID. This could be used by other organizations to access your [facility] medical records  [id number]        Your Vitals Were     Pulse BMI (Body Mass Index)                75 24.6 kg/m2           Blood Pressure from Last 3 Encounters:   10/26/18 130/80   06/13/18 128/76   04/17/18 125/71    Weight from Last 3 Encounters:   10/26/18 59.1 kg (130 lb 3.2 oz) (54 %)*   06/13/18 60.1 kg (132 lb 9.6 oz) (59 %)*   04/17/18 59.7 kg (131 lb 9.6 oz) (58 %)*     * Growth percentiles are based on [facility] 2-20 Years data.                 Where to get your medicines      These medications were sent to [facility] [address] [place] [place] [address] [address]  [address] [facility] [address]     Phone:  [phone]     lithium 300 MG capsule    QUEtiapine 50 MG Tb24 24 hr tablet          Primary Care Provider Office Phone # Fax #    Cortez Foster [phone]       [facility] [address] [facility] [address] [place] [address]        Equal Access to Services     [facility] AH: Hadii benito ku hadasho Soomaali, waaxda luqadaha, qaybta kaalmada adeegyada, waxay samuelin hayld lindsey. So [facility] [phone].    ATENCIÓN: Si habla español, tiene a ashton disposición servicios gratuitos de asistencia lingüística. ame al [phone].    We comply with applicable federal civil rights laws and [place] laws. We do not discriminate on the basis of race, color, national origin, age, disability, sex, sexual orientation, or gender identity.            Thank you!     Thank you for choosing PSYCHIATRY CLINIC  for your care. Our goal is always to provide you with excellent care. Hearing back from our patients is one way we can continue to improve our services. Please take a few minutes to complete the written survey that you may receive in the mail after your visit  with us. Thank you!             Your Updated Medication List - Protect others around you: Learn how to safely use, store and throw away your medicines at www.disposemymeds.org.          This list is accurate as of 10/26/18  1:01 PM.  Always use your most recent med list.                   Brand Name Dispense Instructions for use Diagnosis    lithium 300 MG capsule     270 capsule    Take 3 capsules (900 mg) by mouth At Bedtime    Bipolar I disorder (H)       norgestimate-ethinyl estradiol 0.25-35 MG-MCG per tablet    ORTHO-CYCLEN, SPRINTEC     Take 1 tablet by mouth daily        OMEGA-3 FISH OIL PO      Take 1,000 mg by mouth daily        QUEtiapine 50 MG Tb24 24 hr tablet    SEROquel XR    180 tablet    Take 2 tablets (100 mg) by mouth At Bedtime    Bipolar I disorder (H)       WOMENS MULTIVITAMIN PLUS PO      Take by mouth as needed

## 2018-10-26 NOTE — PROGRESS NOTES
Psychiatry Clinic Progress Note                                                                   Zoe Altman is a 17 year old female who returns to the clinic for follow-up care.  Last seen on 1/19/18 by Dr. YVETTE Montiel.  It was reported that she was doing well in school and at work.  Her mental health was stable and Zoe was successfully cross-tapering from Seroquel to [place].  Therapist: None  PCP: Cortez Foster  Other Providers: None    Pertinent Background:  See previous notes.  Psych critical item history includes psychosis [sxs include disorganized thought process, auditory and visual hallucinations, and paranoia in context of shanel] and psych hosp (<3).     Interim History                                                                                                        4, 4     The patient is a good historian, reports good treatment adherence and was last seen 6/13/18.  Since the last visit, Zoe reports no concerns with symptoms, depression or shanel.  Sleeping regularly.  No excessive energy.  No polydipsia or polysuria.  Reports doing well in school.  Weight stable    Lithium labs ordered.  Will have done at [facility] in [facility].      6/13/18: Zoe successfully completed her freshman year of college.  No concerns with her mental health.  Again, no concerns with elevated mood, increased energy, or decreased need for sleep.  She is sleeping regularly.  She does report daytime sedation and requests her Seroquel dose be lowered.  Zoe reports she knows the symptoms of shanel and will call the clinic if she experiences any symptoms.  Provided education on proper hydration and how excessive due to summer temperatures may impact Lithium levels.      4/17/18:  Zoe does not report any changes in her mental health.  She does not report any concerns with elevated mood, increased energy, and decreased need for sleep.  In February, Zoe had difficulty getting her medications filled at  pharmacy and experienced difficulty falling asleep.  She went a total of 3 days without medications.  She continues to live at home in [place], [place].  Continue to get along with her siblings.  Zoe reports she continues to do well in school.  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  Zoe continues to work at [employer].      Zoe continues to want to taper off Seroquel due to increased sedation.  Her only concern today is sleeping too much.   No concerns with side effects.  No observable tremors or uncontrolled movements.      Seroquel decreased to 150mg today.  Zoe states her first sign of symptom relapse is lack of sleep.  She will call clinic if she does not sleep for more than a night.       Requested to return to clinic in early June    Recent Symptoms:   Depression:  not reported  Elevated:  none  Psychosis:  none  Anxiety:  not reported  Panic Attack:  none  Trauma Related:  none     Recent Substance Use:  no changes reported        Social/ Family History                                  [per patient report]                                 1ea,1ea   FINANCIAL SUPPORT- working       LIVING SITUATION- Lives at home with family      FEELS SAFE AT HOME- Yes    Medical / Surgical History                                                                                                                  Patient Active Problem List   Diagnosis     Bipolar I disorder (H)       No past surgical history on file.     Medical Review of Systems                                                                                                    2,10   A comprehensive review of systems was performed and is negative other than noted in the HPI.  Pregnant- No    Breastfeeding- No    Contraception- Yes  Allergy                                Review of patient's allergies indicates no known allergies.  Current Medications                                                                                                        Current Outpatient Prescriptions   Medication Sig Dispense Refill     lithium 300 MG capsule Take 3 capsules (900 mg) by mouth At Bedtime 9 capsule 0     Multiple Vitamins-Minerals (WOMENS MULTIVITAMIN PLUS PO) Take by mouth as needed        norgestimate-ethinyl estradiol (ORTHO-CYCLEN, SPRINTEC) 0.25-35 MG-MCG per tablet Take 1 tablet by mouth daily       QUEtiapine (SEROQUEL XR) 50 MG TB24 24 hr tablet Take 2 tablets (100 mg) by mouth At Bedtime 4 tablet 0     Omega-3 Fatty Acids (OMEGA-3 FISH OIL PO) Take 1,000 mg by mouth daily       Vitals                                                                                                                       3, 3   /80  Pulse 75  Wt 59.1 kg (130 lb 3.2 oz)  BMI 24.6 kg/m2   Mental Status Exam                                                                                    9, 14 cog gs     Alertness: alert  and oriented  Appearance: casually groomed  Behavior/Demeanor: cooperative, pleasant and calm, with good  eye contact   Speech: regular rate and rhythm  Language: no obvious problem  Psychomotor: normal or unremarkable  Mood: description consistent with euthymia  Affect: full range; was congruent to mood; was congruent to content  Thought Process/Associations: unremarkable  Thought Content:  Reports none;  Denies suicidal ideation and violent ideation  Perception:  Reports none;  Denies auditory hallucinations and visual hallucinations  Insight: good  Judgment: good  Cognition: (6) does  appear grossly intact; formal cognitive testing was not done  Gait/Station and/or Muscle Strength/Tone: unremarkable    Labs and Data                                                                                                                 Rating Scales:    PHQ9    PHQ9 Today:  1  PHQ-9 SCORE 1/19/2018 4/17/2018 6/13/2018   Total Score 0 2 0         Diagnosis and Assessment                                                                              m2, h3     Today the following issues were addressed:    1) Bipolar I Disorder    [place] Prescription Monitoring Program [] was not checked today:  not using controlled substances.    PSYCHOTROPIC DRUG INTERACTIONS: none clinically relevant    Plan                                                                                                                    m2, h3      1) Medication Management  Continue Lithium 900mg at bedtime  Continue Seroquel 100mg at bedtime     Lithium labs ordered  Lipid profile, A1C, and CMP ordered 1/19/18.  Patient reminded of orders.      RTC: 6 months    CRISIS NUMBERS:   Provided routinely in AVS.    Treatment Risk Statement:  The patient understands the risks, benefits, adverse effects and alternatives. Agrees to treatment with the capacity to do so. No medical contraindications to treatment. Agrees to call clinic for any problems. The patient understands to call 911 or go to the nearest ED if life threatening or urgent symptoms occur.     PROVIDER:  ANGIE Cutler CNP

## 2018-10-26 NOTE — PATIENT INSTRUCTIONS
Thank you for coming to the PSYCHIATRY CLINIC.    Lab Testing:  If you had lab testing today and your results are reassuring or normal they will be mailed to you or sent through [employer] within 7 days.   If the lab tests need quick action we will call you with the results.  The phone number we will call with results is # [phone] (home) . If this is not the best number please call our clinic and change the number.    Medication Refills:  If you need any refills please call your pharmacy and they will contact us. Our fax number for refills is [phone]. Please allow three business for refill processing.   If you need to  your refill at a new pharmacy, please contact the new pharmacy directly. The new pharmacy will help you get your medications transferred.     Scheduling:  If you have any concerns about today's visit or wish to schedule another appointment please call our office during normal business hours [phone] (8-5:00 M-F)    Contact Us:  Please call [phone] during business hours (8-5:00 M-F).  If after clinic hours, or on the weekend, please call  [phone].    Financial Assistance [phone]  [facility] Billing [phone]  [facility] Billing [phone]  Medical Records [phone]      MENTAL HEALTH CRISIS NUMBERS:  [facility] [place]:   [facility] - [phone]   Crisis Residence [facility] - [phone]   Walk-In Counseling [facility] [phone]   COPE 24/7 [place] Mobile Team for Adults - [[phone]]; Child - [[phone]]     Crisis Connection - [phone]     [facility]:   [facility] - [phone]   Walk-in counseling [facility] - [phone]   Walk-in counseling [facility] - [phone]   Crisis Residence [facility] - [phone]   Urgent Care Adult Mental Health:   --Drop-in, 24/7 crisis line, and [place] Mobile Team [[phone]]    CRISIS TEXT  LINE: Text [phone] from anywhere, anytime, any crisis 24/7;    OR SEE www.crisistextline.org     Poison Control Center - [phone]    CHILD: [facility] needs assessment team - [phone]     [facility] - [phone]; or [facility] - [phone]    If you have a medical emergency please call 911or go to the nearest ER.                    _____________________________________________    Again thank you for choosing PSYCHIATRY CLINIC and please let us know how we can best partner with you to improve you and your family's health.  You may be receiving a survey in the mail regarding this appointment. We would love to have your feedback, both positive and negative, so please fill out the survey and return it using the provided envelope. The survey is done by an external company, so your answers are anonymous.

## 2018-11-12 ASSESSMENT — PATIENT HEALTH QUESTIONNAIRE - PHQ9: SUM OF ALL RESPONSES TO PHQ QUESTIONS 1-9: 1

## 2019-03-26 DIAGNOSIS — F31.9 BIPOLAR I DISORDER (H): ICD-10-CM

## 2019-03-26 RX ORDER — QUETIAPINE FUMARATE 50 MG/1
100 TABLET, EXTENDED RELEASE ORAL AT BEDTIME
Qty: 60 TABLET | Refills: 0 | Status: SHIPPED | OUTPATIENT
Start: 2019-03-26 | End: 2019-04-30

## 2019-03-26 RX ORDER — LITHIUM CARBONATE 300 MG/1
900 CAPSULE ORAL AT BEDTIME
Qty: 90 CAPSULE | Refills: 0 | Status: SHIPPED | OUTPATIENT
Start: 2019-03-26 | End: 2019-04-30

## 2019-03-26 NOTE — TELEPHONE ENCOUNTER
Chris Fitzpatrick Emily C, RN   Phone Number: [phone]             Caller:  self   Relationship to pt: self   Medication:   Lithium   How many days left of med do you have left (if >3 day supply, redirect to pharmacy): NONE   Pharmacy and location: [facility] [address] [place] [address] [address]   Pending appt date:  4/19/19   Okay to leave detailed VM:  YES     Patient also is low on Seroquel but could not off the top of her head tell writer how many days she had left.      Last seen: 10/26/18  RTC: 6 months  Cancel: none  No-show: none  Next appt: 4/19/19     Incoming refill from pt call to clinic     Medication requested: lithium 300 MG capsule  Directions: Take 3 capsules (900 mg) by mouth At Bedtime - Oral  Qty: 270 capsule, 1 refill   Last refilled: 10/26/2018    Medication requested:QUEtiapine (SEROQUEL XR) 50 MG TB24 24 hr tablet  Directions: Take 2 tablets (100 mg) by mouth At Bedtime - Oral  Qty:180 tablet, 1 refill   Last refilled: 10/26/2018     Writer called pt and discussed lithium level and TSH labs that were ordered to be drawn. Pt stated she would have labs drawn by the end of the week.     Medication refill approved per refill protocol, 30 day supply sent to pharmacy.

## 2019-04-30 ENCOUNTER — TELEPHONE (OUTPATIENT)
Dept: PSYCHIATRY | Facility: CLINIC | Age: 19
End: 2019-04-30

## 2019-04-30 ENCOUNTER — OFFICE VISIT (OUTPATIENT)
Dept: PSYCHIATRY | Facility: CLINIC | Age: 19
End: 2019-04-30
Attending: NURSE PRACTITIONER
Payer: COMMERCIAL

## 2019-04-30 VITALS
DIASTOLIC BLOOD PRESSURE: 82 MMHG | BODY MASS INDEX: 24.56 KG/M2 | WEIGHT: 130 LBS | SYSTOLIC BLOOD PRESSURE: 123 MMHG | HEART RATE: 75 BPM

## 2019-04-30 DIAGNOSIS — F31.9 BIPOLAR I DISORDER (H): Primary | ICD-10-CM

## 2019-04-30 PROCEDURE — G0463 HOSPITAL OUTPT CLINIC VISIT: HCPCS | Mod: ZF

## 2019-04-30 RX ORDER — LITHIUM CARBONATE 300 MG/1
900 CAPSULE ORAL AT BEDTIME
Qty: 90 CAPSULE | Refills: 4 | Status: SHIPPED | OUTPATIENT
Start: 2019-04-30 | End: 2019-11-21

## 2019-04-30 RX ORDER — QUETIAPINE FUMARATE 50 MG/1
100 TABLET, EXTENDED RELEASE ORAL AT BEDTIME
Qty: 60 TABLET | Refills: 4 | Status: SHIPPED | OUTPATIENT
Start: 2019-04-30 | End: 2019-11-21

## 2019-04-30 ASSESSMENT — PAIN SCALES - GENERAL: PAINLEVEL: NO PAIN (0)

## 2019-04-30 NOTE — PROGRESS NOTES
"  Psychiatry Clinic Progress Note                                                                   Zoe Altman is a 18 year old female who returns to the clinic for follow-up care.  Last seen on 1/19/18 by Dr. YVETTE Montiel.  It was reported that she was doing well in school and at work.  Her mental health was stable and Zoe was successfully cross-tapering from Seroquel to [place].  Therapist: None  PCP: Cortez Foster  Other Providers: None    Pertinent Background:  See previous notes.  Psych critical item history includes psychosis [sxs include disorganized thought process, auditory and visual hallucinations, and paranoia in context of shanel] and psych hosp (<3).     Interim History                                                                                                        4, 4     The patient is a good historian, reports good treatment adherence and was last seen 10/26/18.  Since the last visit, Zoe reports she has no concerns with symptoms of shanel or depression.  She has pulled two separate \"all-nighters\" to complete homework.  Was unable to function the following the day and needed to catch up on sleep.  Sleep overall is good.  Zoe is curious about decreasing/discontinuing Seroquel due to sedation.  Will discuss further at next appointment.      10/26/18: Zoe reports no concerns with symptoms, depression or shanel.  Sleeping regularly.  No excessive energy.  No polydipsia or polysuria.  Reports doing well in school.  Weight stable. Lithium labs ordered.  Will have done at [facility] in [facility].      6/13/18: Zoe successfully completed her freshman year of college.  No concerns with her mental health.  Again, no concerns with elevated mood, increased energy, or decreased need for sleep.  She is sleeping regularly.  She does report daytime sedation and requests her Seroquel dose be lowered.  Zoe reports she knows the symptoms of shanel and will call the clinic if she " experiences any symptoms.  Provided education on proper hydration and how excessive due to summer temperatures may impact Lithium levels.      4/17/18:  Zoe does not report any changes in her mental health.  She does not report any concerns with elevated mood, increased energy, and decreased need for sleep.  In February, Zoe had difficulty getting her medications filled at pharmacy and experienced difficulty falling asleep.  She went a total of 3 days without medications.  She continues to live at home in [place], [place].  Continue to get along with her siblings.  Zoe reports she continues to do well in school.  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  Zoe continues to work at [employer].      Zoe continues to want to taper off Seroquel due to increased sedation.  Her only concern today is sleeping too much.   No concerns with side effects.  No observable tremors or uncontrolled movements.      Seroquel decreased to 150mg today.  Zoe states her first sign of symptom relapse is lack of sleep.  She will call clinic if she does not sleep for more than a night.       Requested to return to clinic in early June    Recent Symptoms:   Depression:  not reported  Elevated:  none  Psychosis:  none  Anxiety:  not reported  Panic Attack:  none  Trauma Related:  none     Recent Substance Use:  no changes reported        Social/ Family History                                  [per patient report]                                 1ea,1ea   FINANCIAL SUPPORT- working       LIVING SITUATION- Lives at home with family      FEELS SAFE AT HOME- Yes    Medical / Surgical History                                                                                                                  Patient Active Problem List   Diagnosis     Bipolar I disorder (H)       No past surgical history on file.     Medical Review of Systems                                                                                                     2,10   A comprehensive review of systems was performed and is negative other than noted in the HPI.  Pregnant- No    Breastfeeding- No    Contraception- Yes  Allergy                                Patient has no known allergies.  Current Medications                                                                                                       Current Outpatient Medications   Medication Sig Dispense Refill     lithium 300 MG capsule Take 3 capsules (900 mg) by mouth At Bedtime 90 capsule 0     Multiple Vitamins-Minerals (WOMENS MULTIVITAMIN PLUS PO) Take by mouth as needed        norgestimate-ethinyl estradiol (ORTHO-CYCLEN, SPRINTEC) 0.25-35 MG-MCG per tablet Take 1 tablet by mouth daily       QUEtiapine (SEROQUEL XR) 50 MG TB24 24 hr tablet Take 2 tablets (100 mg) by mouth At Bedtime 60 tablet 0     Omega-3 Fatty Acids (OMEGA-3 FISH OIL PO) Take 1,000 mg by mouth daily       Vitals                                                                                                                       3, 3   /82   Pulse 75   Wt 59 kg (130 lb)   BMI 24.56 kg/m     Mental Status Exam                                                                                    9, 14 cog gs     Alertness: alert  and oriented  Appearance: casually groomed  Behavior/Demeanor: cooperative, pleasant and calm, with good  eye contact   Speech: normal  Language: good  Psychomotor: normal or unremarkable  Mood: description consistent with euthymia  Affect: full range; was congruent to mood; was congruent to content  Thought Process/Associations: unremarkable  Thought Content:  Reports none;  Denies suicidal ideation and violent ideation  Perception:  Reports none;  Denies auditory hallucinations and visual hallucinations  Insight: good  Judgment: good  Cognition: (6) does  appear grossly intact; formal cognitive testing was not done  Gait/Station and/or Muscle Strength/Tone:  unremarkable    Labs and Data                                                                                                                 Rating Scales:    PHQ9    PHQ9 Today:  2  PHQ-9 SCORE 4/17/2018 6/13/2018 10/26/2018   PHQ-9 Total Score 2 0 1         Diagnosis and Assessment                                                                             m2, h3     Today the following issues were addressed:    1) Bipolar I Disorder    [place] Pres[place] [] was not checked today:  not using controlled substances.    PSYCHOTROPIC DRUG INTERACTIONS: none clinically relevant    Plan                                                                                                                    m2, h3      1) Medication Management  Continue Lithium 900mg at bedtime  Continue Seroquel 100mg at bedtime     Lithium labs ordered  Lipid profile, A1C, and CMP ordered 1/19/18.  Patient reminded of orders.      RTC: 6 months    CRISIS NUMBERS:   Provided routinely in AVS.    Treatment Risk Statement:  The patient understands the risks, benefits, adverse effects and alternatives. Agrees to treatment with the capacity to do so. No medical contraindications to treatment. Agrees to call clinic for any problems. The patient understands to call 911 or go to the nearest ED if life threatening or urgent symptoms occur.     PROVIDER:  ANGIE Cutler CNP

## 2019-04-30 NOTE — TELEPHONE ENCOUNTER
Writer reviewed pt's chart. Appears future lab orders still exist for TSH and Lithium. Called lab and confirmed this. Called pt and notified her of this as well. Pt is unfortunately outside of the timeframe to complete the Lithium level for an accurate result. Provided education, as to when the lithium level should typically be completed. She voiced understanding. Pt will still complete labs and writer agreed to notify provider of this.

## 2019-04-30 NOTE — TELEPHONE ENCOUNTER
[facility] Call Center    Phone Message    May a detailed message be left on voicemail: yes    Reason for Call: Order(s): Other: Lab Orders  Reason for requested: TSH, Lithium  Date needed: today  Provider name: Chandra Herrera      Action Taken: Message routed to:  Other: Lupis Doyle RNCC

## 2019-05-30 ASSESSMENT — PATIENT HEALTH QUESTIONNAIRE - PHQ9: SUM OF ALL RESPONSES TO PHQ QUESTIONS 1-9: 2

## 2019-11-14 DIAGNOSIS — F31.9 BIPOLAR I DISORDER (H): ICD-10-CM

## 2019-11-15 NOTE — TELEPHONE ENCOUNTER
"Medication requested:  QUETIAPINE FUM ER 50MG TABTake 2 tablets (100 mg) by mouth At Bedtime -   Last refilled: 4/30/19  Qty: 60/ 4 rfl  Medication requested: LITHIUM CARB 300MG CAP Take 3 capsules (900 mg) by mouth At Bedtime   Last refilled:4/30/2019  Qty: 90/4    Last seen: 4/30/19  RTC: 6 months  Cancel: 0  No-show:3  Next appt: None    Refill decision:   Scheduling has been notified to contact the pt for appointment.   Refill pended and routed to the provider for review/determination due to  NSH x 3.        Per 10/11/2019 note re : medications\"   Pt confirmed that she has been taking the medication consistently. She is now scheduled with the provider on 11/7 and will have enough medication until that appt. She does have concerns regarding the Seroquel. Pt feels this has been making her very groggy and would like to stop the med. \"      "

## 2019-11-15 NOTE — TELEPHONE ENCOUNTER
Chandra Herrera APRN CNP  Lupis Feliciano RN; Veronica Hernandez, RN   Caller: Unspecified (Yesterday,  1:02 PM)             Hello,     Could someone call Zoe?  I have not seen her in months due to no shows.  Also she should have run out of medication months ago     Thanks     Jon        Called and left  for patient requesting a return call to discuss her refill requests.  Based on the dates Seroquel and Lithium were ordered, she should have enough only until the end of September.

## 2019-11-18 NOTE — TELEPHONE ENCOUNTER
Patient returned call and left voicemail in clinic to discuss her medication refill. She would like a call back when someone is available. She reports that she plans on scheduling an appointment in Chandra's next available appointment slot, and promises that she will not miss this appointment.

## 2019-11-18 NOTE — TELEPHONE ENCOUNTER
Returned patient's call to [phone].  Left VM requesting a return call to discuss refills and to offer an appointment for November 27, 2019.

## 2019-11-21 ENCOUNTER — OFFICE VISIT (OUTPATIENT)
Dept: PSYCHIATRY | Facility: CLINIC | Age: 19
End: 2019-11-21
Attending: NURSE PRACTITIONER
Payer: COMMERCIAL

## 2019-11-21 VITALS
SYSTOLIC BLOOD PRESSURE: 120 MMHG | HEART RATE: 67 BPM | WEIGHT: 132.4 LBS | DIASTOLIC BLOOD PRESSURE: 82 MMHG | BODY MASS INDEX: 25.02 KG/M2

## 2019-11-21 DIAGNOSIS — F31.9 BIPOLAR I DISORDER (H): ICD-10-CM

## 2019-11-21 LAB
ALBUMIN SERPL-MCNC: 4.2 G/DL (ref 3.4–5)
ALP SERPL-CCNC: 82 U/L (ref 40–150)
ALT SERPL W P-5'-P-CCNC: 21 U/L (ref 0–50)
ANION GAP SERPL CALCULATED.3IONS-SCNC: 7 MMOL/L (ref 3–14)
AST SERPL W P-5'-P-CCNC: 16 U/L (ref 0–45)
BILIRUB SERPL-MCNC: 0.5 MG/DL (ref 0.2–1.3)
BUN SERPL-MCNC: 16 MG/DL (ref 7–30)
CALCIUM SERPL-MCNC: 8.9 MG/DL (ref 8.5–10.1)
CHLORIDE SERPL-SCNC: 108 MMOL/L (ref 94–109)
CHOLEST SERPL-MCNC: 205 MG/DL
CO2 SERPL-SCNC: 25 MMOL/L (ref 20–32)
CREAT SERPL-MCNC: 0.76 MG/DL (ref 0.52–1.04)
GFR SERPL CREATININE-BSD FRML MDRD: >90 ML/MIN/{1.73_M2}
GLUCOSE SERPL-MCNC: 87 MG/DL (ref 70–99)
HDLC SERPL-MCNC: 73 MG/DL
LDLC SERPL CALC-MCNC: 119 MG/DL
NONHDLC SERPL-MCNC: 132 MG/DL
POTASSIUM SERPL-SCNC: 3.8 MMOL/L (ref 3.4–5.3)
PROT SERPL-MCNC: 7.3 G/DL (ref 6.8–8.8)
SODIUM SERPL-SCNC: 140 MMOL/L (ref 133–144)
TRIGL SERPL-MCNC: 64 MG/DL
TSH SERPL DL<=0.005 MIU/L-ACNC: 1.41 MU/L (ref 0.4–4)

## 2019-11-21 PROCEDURE — 84443 ASSAY THYROID STIM HORMONE: CPT | Performed by: NURSE PRACTITIONER

## 2019-11-21 PROCEDURE — 36415 COLL VENOUS BLD VENIPUNCTURE: CPT | Performed by: NURSE PRACTITIONER

## 2019-11-21 PROCEDURE — 80053 COMPREHEN METABOLIC PANEL: CPT | Performed by: NURSE PRACTITIONER

## 2019-11-21 PROCEDURE — 80061 LIPID PANEL: CPT | Performed by: NURSE PRACTITIONER

## 2019-11-21 PROCEDURE — G0463 HOSPITAL OUTPT CLINIC VISIT: HCPCS | Mod: ZF

## 2019-11-21 RX ORDER — LITHIUM CARBONATE 300 MG/1
900 CAPSULE ORAL AT BEDTIME
Qty: 90 CAPSULE | Refills: 4 | Status: SHIPPED | OUTPATIENT
Start: 2019-11-21 | End: 2020-04-22

## 2019-11-21 RX ORDER — QUETIAPINE FUMARATE 50 MG/1
100 TABLET, EXTENDED RELEASE ORAL AT BEDTIME
Qty: 60 TABLET | Refills: 4 | Status: SHIPPED | OUTPATIENT
Start: 2019-11-21 | End: 2020-04-22

## 2019-11-21 ASSESSMENT — PAIN SCALES - GENERAL: PAINLEVEL: NO PAIN (0)

## 2019-11-21 NOTE — PROGRESS NOTES
"  Psychiatry Clinic Progress Note                                                                   Zoe Altman is a 18 year old female who returns to the clinic for follow-up care.  Last seen on 1/19/18 by Dr. YVETTE Montiel.  It was reported that she was doing well in school and at work.  Her mental health was stable and Zoe was successfully cross-tapering from Seroquel to [place].  Therapist: None  PCP: Cortez Foster  Other Providers: None    Pertinent Background:  See previous notes.  Psych critical item history includes psychosis [sxs include disorganized thought process, auditory and visual hallucinations, and paranoia in context of shanel] and psych hosp (<3).     Interim History                                                                                                        4, 4     The patient is a good historian, reports good treatment adherence and was last seen 4/30/19.  Since the last visit, Zoe reports her mood is stable.  Missed last appointments due to busy schedule (work/school).  Reports medication adherence.  Sleep is good and reports no decreased need for sleep.  Reports she is successful at school.  Continues to discuss possibility of decreasing/discontinuing Seroquel.  Decided to try during summer break.  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  No concerns with tremors, polydipsia, or polyuria.      4/30/19: Zoe reports she has no concerns with symptoms of shanel or depression.  She has pulled two separate \"all-nighters\" to complete homework.  Was unable to function the following the day and needed to catch up on sleep.  Sleep overall is good.  Zoe is curious about decreasing/discontinuing Seroquel due to sedation.  Will discuss further at next appointment.      10/26/18: Zoe reports no concerns with symptoms, depression or shanel.  Sleeping regularly.  No excessive energy.  No polydipsia or polysuria.  Reports " doing well in school.  Weight stable. Lithium labs ordered.  Will have done at [facility].      6/13/18: Zoe successfully completed her freshman year of college.  No concerns with her mental health.  Again, no concerns with elevated mood, increased energy, or decreased need for sleep.  She is sleeping regularly.  She does report daytime sedation and requests her Seroquel dose be lowered.  Zoe reports she knows the symptoms of shanel and will call the clinic if she experiences any symptoms.  Provided education on proper hydration and how excessive due to summer temperatures may impact Lithium levels.      4/17/18:  Zoe does not report any changes in her mental health.  She does not report any concerns with elevated mood, increased energy, and decreased need for sleep.  In February, Zoe had difficulty getting her medications filled at pharmacy and experienced difficulty falling asleep.  She went a total of 3 days without medications.  She continues to live at home in [place], [place].  Continue to get along with her siblings.  Zoe reports she continues to do well in school.  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  Zoe continues to work at [employer].      Zoe continues to want to taper off Seroquel due to increased sedation.  Her only concern today is sleeping too much.   No concerns with side effects.  No observable tremors or uncontrolled movements.      Seroquel decreased to 150mg today.  Zoe states her first sign of symptom relapse is lack of sleep.  She will call clinic if she does not sleep for more than a night.       Requested to return to clinic in early June    Recent Symptoms:   Depression:  insomnia  Elevated:  none  Psychosis:  none  Anxiety:  not reported  Panic Attack:  none  Trauma Related:  none     Recent Substance Use:  no changes reported        Social/ Family History                                  [per  patient report]                                 1ea,1ea   FINANCIAL SUPPORT- working       LIVING SITUATION- Lives at home with family      FEELS SAFE AT HOME- Yes    Medical / Surgical History                                                                                                                  Patient Active Problem List   Diagnosis     Bipolar I disorder (H)       No past surgical history on file.     Medical Review of Systems                                                                                                    2,10   A comprehensive review of systems was performed and is negative other than noted in the HPI.  Pregnant- No    Breastfeeding- No    Contraception- Yes  Allergy                                Patient has no known allergies.  Current Medications                                                                                                       Current Outpatient Medications   Medication Sig Dispense Refill     Multiple Vitamins-Minerals (WOMENS MULTIVITAMIN PLUS PO) Take by mouth as needed        norgestimate-ethinyl estradiol (ORTHO-CYCLEN, SPRINTEC) 0.25-35 MG-MCG per tablet Take 1 tablet by mouth daily       QUEtiapine (SEROQUEL XR) 50 MG TB24 24 hr tablet Take 2 tablets (100 mg) by mouth At Bedtime 60 tablet 4     lithium 300 MG capsule Take 3 capsules (900 mg) by mouth At Bedtime (Patient not taking: Reported on 11/21/2019) 90 capsule 4     Omega-3 Fatty Acids (OMEGA-3 FISH OIL PO) Take 1,000 mg by mouth daily       Vitals                                                                                                                       3, 3   /82   Pulse 67   Wt 60.1 kg (132 lb 6.4 oz)   BMI 25.02 kg/m     Mental Status Exam                                                                                    9, 14 cog gs     Alertness: alert  and oriented  Appearance: casually groomed  Behavior/Demeanor: cooperative, pleasant and calm, with good  eye contact    Speech: regular rate and rhythm  Language: no problems  Psychomotor: normal or unremarkable  Mood: description consistent with euthymia  Affect: full range; was congruent to mood; was congruent to content  Thought Process/Associations: unremarkable  Thought Content:  Reports none;  Denies suicidal ideation and violent ideation  Perception:  Reports none;  Denies auditory hallucinations and visual hallucinations  Insight: good  Judgment: good  Cognition: (6) does  appear grossly intact; formal cognitive testing was not done  Gait/Station and/or Muscle Strength/Tone: unremarkable    Labs and Data                                                                                                                 Rating Scales:    PHQ9    PHQ9 Today:  3  PHQ-9 SCORE 6/13/2018 10/26/2018 4/30/2019   PHQ-9 Total Score 0 1 2         Diagnosis and Assessment                                                                             m2, h3     Today the following issues were addressed:    1) Bipolar I Disorder    [place] Prescription Monitoring [place] [] was not checked today:  not using controlled substances.    PSYCHOTROPIC DRUG INTERACTIONS: none clinically relevant    Plan                                                                                                                    m2, h3      1) Medication Management  Continue Lithium 900mg at bedtime  Continue Seroquel 100mg at bedtime     Lithium labs ordered 10/26/19.  Will provide reminder calls over her winter school break to get labs completed    RTC: 6 months    CRISIS NUMBERS:   Provided routinely in AVS.    Treatment Risk Statement:  The patient understands the risks, benefits, adverse effects and alternatives. Agrees to treatment with the capacity to do so. No medical contraindications to treatment. Agrees to call clinic for any problems. The patient understands to call 911 or go to the nearest ED if life threatening or urgent symptoms occur.     PROVIDER:   Chandra Herrera, ANGIE CNP

## 2019-11-22 RX ORDER — LITHIUM CARBONATE 300 MG/1
900 CAPSULE ORAL AT BEDTIME
Qty: 90 CAPSULE | Refills: 0
Start: 2019-11-22

## 2019-11-22 RX ORDER — QUETIAPINE FUMARATE 50 MG/1
100 TABLET, EXTENDED RELEASE ORAL AT BEDTIME
Qty: 60 TABLET | Refills: 0
Start: 2019-11-22

## 2019-12-20 ENCOUNTER — TELEPHONE (OUTPATIENT)
Dept: PSYCHIATRY | Facility: CLINIC | Age: 19
End: 2019-12-20

## 2019-12-20 DIAGNOSIS — F31.9 BIPOLAR I DISORDER (H): Primary | ICD-10-CM

## 2019-12-20 DIAGNOSIS — Z51.81 ENCOUNTER FOR THERAPEUTIC DRUG MONITORING: ICD-10-CM

## 2019-12-20 NOTE — TELEPHONE ENCOUNTER
Chandra Herrera APRN CNP [place], Laura, MARIS Baugh,     When you have time, could you please call Zoe to remind her to get Lithium levels checked over her holiday break?  She is way overdue.     Thanks     Chandra        Writer called pt. No answer. LVM requesting c/b to confirm which lab she can complete her lithium levels at, as she lives in [place], [place].

## 2020-02-06 NOTE — TELEPHONE ENCOUNTER
Writer received incoming phone call from the pt. She apologized for the delay in calling back. Pt agreed to complete lithium lab, but wants to make sure there's an order first. Reviewed pt's chart. Appears all lab orders have .     Confirmed that pt would like lab orders sent to [facility] in [facility]. Pt would like a c/b when the orders are at the lab.    Called the clinic and confirmed the lab order still needs to be faxed. Once signed by the provider, lab can be faxed to [phone].    Order printed and placed in provider's folder for signature.

## 2020-02-11 NOTE — TELEPHONE ENCOUNTER
Received signed order from Chandra Herrera DNP.  Faxed it to the [facility] lab at [phone].    Called and left  for patient advising her that the lab order has been faxed to the clinic.  Asked her to call back if she has any questions.  Provided clinic number.

## 2020-02-14 NOTE — TELEPHONE ENCOUNTER
Received a fax from [facility] indicating that the patient is not a current patient at their clinic.  Called them at [phone] and spoke with Kimberly, who affirmed that if the patient is not already an established patient, then they cannot draw their labs.      Called patient to let her know the above. She will just get her labs drawn at the [facility].  Reminded her to get the blood drawn about 12 hours after her evening dose.  She expressed understanding.    Called the lab and spoke with Hesham.  He checked to see if the order currently in Epic is valid for a blood draw at their lab, or if it needs to be re-entered as an internal order.  He indicated that it is fine as it is.  No further action needed at this time.

## 2020-03-10 ENCOUNTER — HEALTH MAINTENANCE LETTER (OUTPATIENT)
Age: 20
End: 2020-03-10

## 2020-04-21 ENCOUNTER — TELEPHONE (OUTPATIENT)
Dept: PSYCHIATRY | Facility: CLINIC | Age: 20
End: 2020-04-21

## 2020-04-21 DIAGNOSIS — F31.9 BIPOLAR I DISORDER (H): ICD-10-CM

## 2020-04-21 NOTE — TELEPHONE ENCOUNTER
Writer received incoming phone call from pt at the number in the demographics section. Pt is scheduled for MFU with the provider on 5/12. She planned on completing a Lithium level prior to that office visit. The pt is wondering if she should still do this or hold off due to COVID-19. Agreed to ask the provider and call her back.    Pt would also like to know if she will still get a refill of her lithium without completing the Lithium level. Confirmed that provider will fill this and writer can fill prior to 5/12 if patient will run out before the appt.    Pt denies any SEs from the medication and denies any overall concerns.

## 2020-04-22 RX ORDER — QUETIAPINE FUMARATE 50 MG/1
100 TABLET, EXTENDED RELEASE ORAL AT BEDTIME
Qty: 60 TABLET | Refills: 0 | Status: SHIPPED | OUTPATIENT
Start: 2020-04-22 | End: 2020-05-19

## 2020-04-22 RX ORDER — LITHIUM CARBONATE 300 MG/1
900 CAPSULE ORAL AT BEDTIME
Qty: 90 CAPSULE | Refills: 0 | Status: SHIPPED | OUTPATIENT
Start: 2020-04-22 | End: 2020-05-19

## 2020-04-22 NOTE — TELEPHONE ENCOUNTER
Writer discussed situation with the provider. He will continue to use doxy. Agreed to provide this information to the pt. Regarding lithium, he wants writer to confirm if she has been experiencing any SEs or toxicity symptoms and whether she is completely adherent. If she does not have any concerns, she can hold off on the lab draw and receive a refill.    Called pt and confirmed she has not missed any doses and is not experiencing any SEs or concerns regarding the lithium. She has 12 days remaining of the medication. Agreed to send an additional refill, as appt is not for 3 more weeks. Pt also requested a refill of Seroquel, which writer agreed to send.    Will send Doxy instructions via [employer] per pt's request.

## 2020-05-19 ENCOUNTER — VIRTUAL VISIT (OUTPATIENT)
Dept: PSYCHIATRY | Facility: CLINIC | Age: 20
End: 2020-05-19
Attending: NURSE PRACTITIONER
Payer: COMMERCIAL

## 2020-05-19 DIAGNOSIS — F31.9 BIPOLAR I DISORDER (H): ICD-10-CM

## 2020-05-19 RX ORDER — LITHIUM CARBONATE 300 MG/1
900 CAPSULE ORAL AT BEDTIME
Qty: 90 CAPSULE | Refills: 2 | Status: SHIPPED | OUTPATIENT
Start: 2020-05-19 | End: 2020-10-06

## 2020-05-19 RX ORDER — QUETIAPINE FUMARATE 50 MG/1
100 TABLET, EXTENDED RELEASE ORAL AT BEDTIME
Qty: 60 TABLET | Refills: 2 | Status: SHIPPED | OUTPATIENT
Start: 2020-05-19 | End: 2020-10-06

## 2020-05-19 ASSESSMENT — PAIN SCALES - GENERAL: PAINLEVEL: NO PAIN (0)

## 2020-05-19 NOTE — PROGRESS NOTES
"  Psychiatry Clinic Progress Note                                                                   Zoe Altman is a 19 year old female who returns to the clinic for follow-up care.  Last seen on 1/19/18 by Dr. YVETTE Montiel.  It was reported that she was doing well in school and at work.  Her mental health was stable and Zoe was successfully cross-tapering from Seroquel to [place].  Therapist: None  PCP: Cortez Foster  Other Providers: None    Pertinent Background:  See previous notes.  Psych critical item history includes psychosis [sxs include disorganized thought process, auditory and visual hallucinations, and paranoia in context of shanel] and psych hosp (<3).     Interim History                                                                                                        4, 4     The patient is a good historian, reports good treatment adherence and was last seen 4/30/19.  Since the last visit, Zoe decided to take a semester off from school.  She is living independently in [place].  Working as the  at [email or web address].  Zoe reports her mood is \"good.\"  No concerns with elevated mood or depression.  Sleep is \"good.\"  Zoe has not been able to get Lithium level in over a year.  Orders have been placed but not completed.  Will consult with pharmacy about possibility of getting phlebotomist to see her at home.     11/21/19: Zoe reports her mood is stable.  Missed last appointments due to busy schedule (work/school).  Reports medication adherence.  Sleep is good and reports no decreased need for sleep.  Reports she is successful at school.  Continues to discuss possibility of decreasing/discontinuing Seroquel.  Decided to try during summer break.  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  No concerns with tremors, polydipsia, or polyuria.      4/30/19: Zoe reports she has no concerns with symptoms of shanel or " "depression.  She has pulled two separate \"all-nighters\" to complete homework.  Was unable to function the following the day and needed to catch up on sleep.  Sleep overall is good.  Zoe is curious about decreasing/discontinuing Seroquel due to sedation.  Will discuss further at next appointment.      10/26/18: Zoe reports no concerns with symptoms, depression or shanel.  Sleeping regularly.  No excessive energy.  No polydipsia or polysuria.  Reports doing well in school.  Weight stable. Lithium labs ordered.  Will have done at [facility] in [facility].      6/13/18: Zoe successfully completed her freshman year of college.  No concerns with her mental health.  Again, no concerns with elevated mood, increased energy, or decreased need for sleep.  She is sleeping regularly.  She does report daytime sedation and requests her Seroquel dose be lowered.  Zoe reports she knows the symptoms of shanel and will call the clinic if she experiences any symptoms.  Provided education on proper hydration and how excessive due to summer temperatures may impact Lithium levels.      4/17/18:  Zoe does not report any changes in her mental health.  She does not report any concerns with elevated mood, increased energy, and decreased need for sleep.  In February, Zoe had difficulty getting her medications filled at pharmacy and experienced difficulty falling asleep.  She went a total of 3 days without medications.  She continues to live at home in [place], [place].  Continue to get along with her siblings.  Zoe reports she continues to do well in school.  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  Zoe continues to work at [employer].      Zoe continues to want to taper off Seroquel due to increased sedation.  Her only concern today is sleeping too much.   No concerns with side effects.  No observable tremors or uncontrolled movements.      Seroquel decreased to " 150mg today.  Zoe states her first sign of symptom relapse is lack of sleep.  She will call clinic if she does not sleep for more than a night.       Requested to return to clinic in early June    Recent Symptoms:   Depression:  insomnia  Elevated:  none  Psychosis:  none  Anxiety:  not reported  Panic Attack:  none  Trauma Related:  none     Recent Substance Use:  no changes reported        Social/ Family History                                  [per patient report]                                 1ea,1ea   FINANCIAL SUPPORT- working       LIVING SITUATION- Lives at home with family      FEELS SAFE AT HOME- Yes    Medical / Surgical History                                                                                                                  Patient Active Problem List   Diagnosis     Bipolar I disorder (H)       No past surgical history on file.     Medical Review of Systems                                                                                                    2,10   A comprehensive review of systems was performed and is negative other than noted in the HPI.  Pregnant- No    Breastfeeding- No    Contraception- Yes  Allergy                                Patient has no known allergies.  Current Medications                                                                                                       Current Outpatient Medications   Medication Sig Dispense Refill     lithium 300 MG capsule Take 3 capsules (900 mg) by mouth At Bedtime 90 capsule 0     Multiple Vitamins-Minerals (WOMENS MULTIVITAMIN PLUS PO) Take by mouth as needed        norgestimate-ethinyl estradiol (ORTHO-CYCLEN, SPRINTEC) 0.25-35 MG-MCG per tablet Take 1 tablet by mouth daily       Omega-3 Fatty Acids (OMEGA-3 FISH OIL PO) Take 1,000 mg by mouth daily       QUEtiapine (SEROQUEL XR) 50 MG TB24 24 hr tablet Take 2 tablets (100 mg) by mouth At Bedtime 60 tablet 0     Vitals                                               "                                                                         3, 3   There were no vitals taken for this visit.   Mental Status Exam                                                                                    9, 14 cog gs     Alertness: alert  and oriented  Appearance: casually groomed  Behavior/Demeanor: cooperative, pleasant and calm, with good  eye contact   Speech: normal  Language: good  Psychomotor: normal or unremarkable  Mood: \"good\"  Affect: appropriate; was congruent to mood; was congruent to content  Thought Process/Associations: unremarkable  Thought Content:  Reports none;  Denies suicidal ideation and violent ideation  Perception:  Reports none;  Denies auditory hallucinations and visual hallucinations  Insight: good  Judgment: good  Cognition: (6) does  appear grossly intact; formal cognitive testing was not done  Gait/Station and/or Muscle Strength/Tone: unremarkable    Labs and Data                                                                                                                 Rating Scales:    PHQ9    PHQ9 Today:  Not completed  PHQ-9 SCORE 6/13/2018 10/26/2018 4/30/2019   PHQ-9 Total Score 0 1 2         Diagnosis and Assessment                                                                             m2, h3     Today the following issues were addressed:    1) Bipolar I Disorder    [place] [] was not checked today:  not using controlled substances.    PSYCHOTROPIC DRUG INTERACTIONS: none clinically relevant    Plan                                                                                                                    m2, h3      1) Medication Management  Continue Lithium 900mg at bedtime  Continue Seroquel 100mg at bedtime     Lithium labs needed.  Orders have been placed but not completed by lab    RTC: 3 months    CRISIS NUMBERS:   Provided routinely in AVS.    Treatment Risk Statement:  The patient understands the " "risks, benefits, adverse effects and alternatives. Agrees to treatment with the capacity to do so. No medical contraindications to treatment. Agrees to call clinic for any problems. The patient understands to call 911 or go to the nearest ED if life threatening or urgent symptoms occur.     PROVIDER:  ANGIE Cutler CNP    VIDEO VISIT  Zoe Altman is a 19 year old patient who is being evaluated via a billable video visit.      The patient has been notified of following:   \"This video visit will be conducted via a call between you and your physician/provider. We have found that certain health care needs can be provided without the need for an in-person physical exam. This service lets us provide the care you need with a video conversation. If a prescription is necessary we can send it directly to your pharmacy. If lab work is needed we can place an order for that and you can then stop by our lab to have the test done at a later time. Insurers are generally covering virtual visits as they would in-office visits so billing should not be different than normal.  If for some reason you do get billed incorrectly, you should contact the billing office to correct it and that number is in the AVS .    Patient has given verbal consent for video visit?:  Yes     How would you like to obtain your AVS?:  9GAG    Video invitation for patient:  DOXY provider, invite not needed      AVS SmartPhrase [PsychAVS] has been placed in 'Patient Instructions':  Yes     "

## 2020-05-19 NOTE — PATIENT INSTRUCTIONS
Thank you for coming to the PSYCHIATRY CLINIC.    Lab Testing:  If you had lab testing today and your results are reassuring or normal they will be mailed to you or sent through [employer] within 7 days.   If the lab tests need quick action we will call you with the results.  The phone number we will call with results is # [phone] (home) . If this is not the best number please call our clinic and change the number.    Medication Refills:  If you need any refills please call your pharmacy and they will contact us. Our fax number for refills is [phone]. Please allow three business for refill processing.   If you need to  your refill at a new pharmacy, please contact the new pharmacy directly. The new pharmacy will help you get your medications transferred.     Scheduling:  If you have any concerns about today's visit or wish to schedule another appointment please call our office during normal business hours [phone] (8-5:00 M-F)    Contact Us:  Please call [phone] during business hours (8-5:00 M-F).  If after clinic hours, or on the weekend, please call [phone].    Financial Assistance: [phone]  [phone]  Central Billing Office, [email or web address]: [phone]  [phone]  Medical Records: [phone]    MENTAL HEALTH CRISIS NUMBERS:  [facility]:   [facility]: [phone]  Crisis Residence [facility]: [phone]   Walk-In Counseling Center [facility]: [phone]   COPE  [place] Mobile Team: [phone] (adults) [phone] (child)     [facility]:   [facility]: [phone]   Walk-in counseling [facility]: [phone]   Walk-in counseling [facility]: [phone]   Crisis Residence [facility] Residence: [phone]   Urgent Care Adult Mental Health: [phone] Mobile team AND  crisis line    Other Crisis Numbers:   National Suicide  Prevention Lifeline: 633-691-ZFVW (786-885-1560)  CRISIS TEXT LINE: Text to 798252 for any crisis 24/7; OR www.crisistextline.org   Poison Control Center: 7-538-464-3842  CHILD: [facility] needs assessment team: [phone]   Trans Lifeline: 1-914.627.9042  [place] [facility] Lifeline: 1-242.923.5709    For a medical emergency please call 911 or go to the nearest ER.         _____________________________________________    Again thank you for choosing PSYCHIATRY CLINIC and please let us know how we can best partner with you to improve you and your family's health.    You may be receiving a survey regarding this appointment. We would love to have your feedback, both positive and negative. The survey is done by an external company, so your answers are anonymous.

## 2020-05-20 ENCOUNTER — TELEPHONE (OUTPATIENT)
Dept: PSYCHIATRY | Facility: CLINIC | Age: 20
End: 2020-05-20

## 2020-05-20 NOTE — TELEPHONE ENCOUNTER
Called pt and relayed the provider's message. She voiced understanding and would like the labs sent to an outside clinic in [place]. She will send a [facility] message with more information about the clinic later today.

## 2020-05-20 NOTE — TELEPHONE ENCOUNTER
Chandra Herrera APRN CNP Labossiere, Laura, MARIS               Good Morning Lupis,     Could you please call Zoe and let her know that a phlebotomist will be unable to see her at home.  She should make an appointment at nearest lab so she won't spend time in waiting room.  Also, how would we get order to lab if not a [place] lab?

## 2020-10-02 DIAGNOSIS — F31.9 BIPOLAR I DISORDER (H): ICD-10-CM

## 2020-10-06 RX ORDER — QUETIAPINE FUMARATE 50 MG/1
100 TABLET, EXTENDED RELEASE ORAL AT BEDTIME
Qty: 60 TABLET | Refills: 0 | Status: SHIPPED | OUTPATIENT
Start: 2020-10-06 | End: 2020-10-08

## 2020-10-06 RX ORDER — LITHIUM CARBONATE 300 MG/1
CAPSULE ORAL
Qty: 90 CAPSULE | Refills: 0 | Status: SHIPPED | OUTPATIENT
Start: 2020-10-06 | End: 2020-10-08

## 2020-10-06 NOTE — TELEPHONE ENCOUNTER
lithium 300 MG    Last refilled: 5/19/20  Qty: 90 : 2  QUEtiapine (SEROQUEL XR) 50 MG    Last refilled: 5/19/20  Qty: 60 : 2    Last seen: 5/19/20  RTC: 3 MOS  Cancel: 0  No-show: 0  Next appt: NONE  Refill decision: 30 day sunny refill sent to the pharmacy - including instructions for patient to call the clinic and schedule an appointment.  MESG TO SCHED/COORD   Will send FYI to provider as is outside RTC timeframe.

## 2020-10-08 ENCOUNTER — VIRTUAL VISIT (OUTPATIENT)
Dept: PSYCHIATRY | Facility: CLINIC | Age: 20
End: 2020-10-08
Attending: NURSE PRACTITIONER
Payer: COMMERCIAL

## 2020-10-08 DIAGNOSIS — F31.9 BIPOLAR I DISORDER (H): ICD-10-CM

## 2020-10-08 PROCEDURE — 99213 OFFICE O/P EST LOW 20 MIN: CPT | Mod: 95 | Performed by: NURSE PRACTITIONER

## 2020-10-08 RX ORDER — LITHIUM CARBONATE 300 MG/1
CAPSULE ORAL
Qty: 90 CAPSULE | Refills: 0 | Status: SHIPPED | OUTPATIENT
Start: 2020-10-08 | End: 2020-11-03

## 2020-10-08 RX ORDER — QUETIAPINE FUMARATE 50 MG/1
50 TABLET, EXTENDED RELEASE ORAL AT BEDTIME
Qty: 30 TABLET | Refills: 0 | Status: SHIPPED | OUTPATIENT
Start: 2020-10-08 | End: 2020-11-03 | Stop reason: DRUGHIGH

## 2020-10-08 ASSESSMENT — PAIN SCALES - GENERAL: PAINLEVEL: NO PAIN (0)

## 2020-10-08 NOTE — PROGRESS NOTES
"Video- Visit Details  Type of service:  video visit for medication management  Time of service:    Date:  10/08/2020    Video Start Time:  10:06 AM        Video End Time:  10:21am    Reason for video visit:  Patient unable to travel due to Covid-19  Originating Site (patient location):  [facility]   Location- Patient's home  Distant Site (provider location):  Remote location  Mode of Communication:  Video Conference via AmWell  Consent:  Patient has given verbal consent for video visit?: Yes     VIDEO VISIT  Zoe Altman is a 19 year old patient who is being evaluated via a billable video visit.      The patient has been notified of following:   \"This video visit will be conducted via a call between you and your physician/provider. We have found that certain health care needs can be provided without the need for an in-person physical exam. This service lets us provide the care you need with a video conversation. If a prescription is necessary we can send it directly to your pharmacy. If lab work is needed we can place an order for that and you can then stop by our lab to have the test done at a later time. Insurers are generally covering virtual visits as they would in-office visits so billing should not be different than normal.  If for some reason you do get billed incorrectly, you should contact the billing office to correct it and that number is in the AVS .    Video Conference to be completed via:  Amwell    Patient has given verbal consent for video visit?:  Yes    Patient would prefer that any video invitations be sent by: Text to cell phone: [phone]      How would patient like to obtain AVS?:  Atterocor    AVS SmartPhrase [PsychAVS] has been placed in 'Patient Instructions':  Yes      Psychiatry Clinic Progress Note                                                                   Zoe Altman is a 19 year old female who returns to the clinic for follow-up care.  Therapist: None  PCP: Cortez Foster " "J  Other Providers: None    Pertinent Background:  See previous notes.  Psych critical item history includes psychosis [sxs include disorganized thought process, auditory and visual hallucinations, and paranoia in context of shanel] and psych hosp (<3).     Interim History                                                                                                        4, 4     The patient is a good historian, reports good treatment adherence and was last seen 5/19/20.  Since the last visit, Zoe reports she is \"good.\" No concerns with depression/shanel.   Sleep is \"good\" and regular.  Has not completed Lithium labs but does not endorse any symptoms associated with toxicity or DI.   Continues to work at [employer].  Continues to be taking time away from school.  Unsure if will return.  Zoe has been without symptoms in over a year she is requesting to decrease her seroquel dose to 50mg at bedtime     5/19/20: Zoe decided to take a semester off from school.  She is living independently in [place].  Working as the  at [employer].  Zoe reports her mood is \"good.\"  No concerns with elevated mood or depression.  Sleep is \"good.\"  Zoe has not been able to get Lithium level in over a year.  Orders have been placed but not completed.  Will consult with pharmacy about possibility of getting phlebotomist to see her at home.     11/21/19: Zoe reports her mood is stable.  Missed last appointments due to busy schedule (work/school).  Reports medication adherence.  Sleep is good and reports no decreased need for sleep.  Reports she is successful at school.  Continues to discuss possibility of decreasing/discontinuing Seroquel.  Decided to try during summer break.  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  No concerns with tremors, polydipsia, or polyuria.      4/30/19: Zoe reports she has no concerns with symptoms of shanel or " "depression.  She has pulled two separate \"all-nighters\" to complete homework.  Was unable to function the following the day and needed to catch up on sleep.  Sleep overall is good.  Zoe is curious about decreasing/discontinuing Seroquel due to sedation.  Will discuss further at next appointment.      10/26/18: Zoe reports no concerns with symptoms, depression or shanel.  Sleeping regularly.  No excessive energy.  No polydipsia or polysuria.  Reports doing well in school.  Weight stable. Lithium labs ordered.  Will have done at [facility].      6/13/18: Zoe successfully completed her freshman year of college.  No concerns with her mental health.  Again, no concerns with elevated mood, increased energy, or decreased need for sleep.  She is sleeping regularly.  She does report daytime sedation and requests her Seroquel dose be lowered.  Zoe reports she knows the symptoms of shanel and will call the clinic if she experiences any symptoms.  Provided education on proper hydration and how excessive due to summer temperatures may impact Lithium levels.      4/17/18:  Zoe does not report any changes in her mental health.  She does not report any concerns with elevated mood, increased energy, and decreased need for sleep.  In February, Zoe had difficulty getting her medications filled at pharmacy and experienced difficulty falling asleep.  She went a total of 3 days without medications.  She continues to live at home in [place], [place].  Continue to get along with her siblings.  Zoe reports she continues to do well in school.  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  Zoe continues to work at [employer].      Zoe continues to want to taper off Seroquel due to increased sedation.  Her only concern today is sleeping too much.   No concerns with side effects.  No observable tremors or uncontrolled movements.      Seroquel decreased to " 150mg today.  Zoe states her first sign of symptom relapse is lack of sleep.  She will call clinic if she does not sleep for more than a night.       Requested to return to clinic in early June    Recent Symptoms:   Depression:  Not endorsed  Elevated:  none  Psychosis:  none  Anxiety:  not reported  Panic Attack:  none  Trauma Related:  none     Recent Substance Use:  no changes reported        Social/ Family History                                  [per patient report]                                 1ea,1ea   FINANCIAL SUPPORT- working       LIVING SITUATION- Lives at home with family      FEELS SAFE AT HOME- Yes    Medical / Surgical History                                                                                                                  Patient Active Problem List   Diagnosis     Bipolar I disorder (H)       No past surgical history on file.     Medical Review of Systems                                                                                                    2,10   A comprehensive review of systems was performed and is negative other than noted in the HPI.  Pregnant- No    Breastfeeding- No    Contraception- Yes  Allergy                                Patient has no known allergies.  Current Medications                                                                                                       Current Outpatient Medications   Medication Sig Dispense Refill     lithium 300 MG capsule TAKE 3 CAPSULES BY MOUTH AT BEDTIME 90 capsule 0     Multiple Vitamins-Minerals (WOMENS MULTIVITAMIN PLUS PO) Take by mouth as needed        norgestimate-ethinyl estradiol (ORTHO-CYCLEN, SPRINTEC) 0.25-35 MG-MCG per tablet Take 1 tablet by mouth daily       Omega-3 Fatty Acids (OMEGA-3 FISH OIL PO) Take 1,000 mg by mouth daily       QUEtiapine (SEROQUEL XR) 50 MG TB24 24 hr tablet Take 2 tablets (100 mg) by mouth At Bedtime * SCHEDULE CLINIC APPT  [phone] 60 tablet 0     Vitals               "                                                                                                         3, 3   There were no vitals taken for this visit.   Mental Status Exam                                                                                    9, 14 cog gs     Alertness: alert  and oriented  Appearance: casually groomed  Behavior/Demeanor: cooperative, pleasant and calm, with good  eye contact   Speech: normal  Language: good  Psychomotor: normal or unremarkable  Mood: \"good\"  Affect: appropriate; was congruent to mood; was congruent to content  Thought Process/Associations: unremarkable  Thought Content:  Reports none;  Denies suicidal ideation and violent ideation  Perception:  Reports none;  Denies auditory hallucinations and visual hallucinations  Insight: good  Judgment: good  Cognition: (6) does  appear grossly intact; formal cognitive testing was not done  Gait/Station and/or Muscle Strength/Tone: unremarkable    Labs and Data                                                                                                                 Rating Scales:    PHQ9    PHQ9 Today:  Not completed  PHQ-9 SCORE 6/13/2018 10/26/2018 4/30/2019   PHQ-9 Total Score 0 1 2         Diagnosis and Assessment                                                                             m2, h3     Today the following issues were addressed:    1) Bipolar I Disorder    [place] Prescription [place] [] was not checked today:  not using controlled substances.    PSYCHOTROPIC DRUG INTERACTIONS: none clinically relevant    Plan                                                                                                                    m2, h3      1) Medication Management  Continue Lithium 900mg at bedtime  Decrease Seroquel to 50mg at bedtime     Lithium labs needed.  Orders have been placed but not completed     RTC: 4 weeks    CRISIS NUMBERS:   Provided routinely in AVS.    Treatment Risk Statement:  The " patient understands the risks, benefits, adverse effects and alternatives. Agrees to treatment with the capacity to do so. No medical contraindications to treatment. Agrees to call clinic for any problems. The patient understands to call 911 or go to the nearest ED if life threatening or urgent symptoms occur.     PROVIDER:  ANGIE Cutler CNP

## 2020-10-08 NOTE — PATIENT INSTRUCTIONS
Thank you for coming to the [facility] [place] [facility].    Lab Testing:  If you had lab testing today and your results are reassuring or normal they will be mailed to you or sent through [employer] within 7 days. If the lab tests need quick action we will call you with the results. The phone number we will call with results is # [phone] (home) . If this is not the best number please call our clinic and change the number.    Medication Refills:  If you need any refills please call your pharmacy and they will contact us. Our fax number for refills is [phone]. Please allow three business for refill processing. If you need to  your refill at a new pharmacy, please contact the new pharmacy directly. The new pharmacy will help you get your medications transferred.     Scheduling:  If you have any concerns about today's visit or wish to schedule another appointment please call our office during normal business hours [phone] (8-5:00 M-F)    Contact Us:  Please call [phone] during business hours (8-5:00 M-F).  If after clinic hours, or on the weekend, please call  [phone].    Financial Assistance [phone]  "[facility]ealth Billing [phone]  Central Billing Office, [facility]: [phone]  [facility] Billing [phone]  Medical Records [phone]      MENTAL HEALTH CRISIS NUMBERS:  For a medical emergency please call  911 or go to the nearest ER.     [facility]:   [facility] -[phone]   Crisis Residence [facility] -[phone]   Walk-In Counseling [facility] -[phone]   COPE 24/7 [facility] Team -[phone] (adults)/[phone] (child)  CHILD: [facility] needs assessment team - [phone]      [facility]:   [facility] - [phone]   Walk-in counseling [facility] - [phone]   Walk-in counseling [facility] - [phone]   Crisis Residence [facility]  [facility] - [phone]  Urgent Care Adult Mental Tnlesq-[phone] mobile unit/ 24/7 crisis line    National Crisis Numbers:   National Suicide Prevention Lifeline: 2-494-651-TALK (665-579-6657)  Poison Control Center - 7-582-329-9236  MeetMeTix/resources for a list of additional resources (SOS)  Trans Lifeline a hotline for transgender people 1-107-073-7620  [employer] a hotline for LGBT youth 2-948-778-2390  Crisis Text Line: For any crisis 24/7   To: 039424  see www.crisistextline.org  - IF MAKING A CALL FEELS TOO HARD, send a text!         Again thank you for choosing [facility] [place] [facility] and please let us know how we can best partner with you to improve you and your family's health.    You may be receiving a survey regarding this appointment. We would love to have your feedback, both positive and negative. The survey is done by an external company, so your answers are anonymous.

## 2020-11-03 ENCOUNTER — VIRTUAL VISIT (OUTPATIENT)
Dept: PSYCHIATRY | Facility: CLINIC | Age: 20
End: 2020-11-03
Attending: NURSE PRACTITIONER
Payer: COMMERCIAL

## 2020-11-03 DIAGNOSIS — F31.9 BIPOLAR I DISORDER (H): ICD-10-CM

## 2020-11-03 PROCEDURE — 99213 OFFICE O/P EST LOW 20 MIN: CPT | Mod: 95 | Performed by: NURSE PRACTITIONER

## 2020-11-03 RX ORDER — QUETIAPINE FUMARATE 25 MG/1
25 TABLET, FILM COATED ORAL AT BEDTIME
Qty: 30 TABLET | Refills: 0 | Status: SHIPPED | OUTPATIENT
Start: 2020-11-03 | End: 2021-01-05

## 2020-11-03 RX ORDER — LITHIUM CARBONATE 300 MG/1
CAPSULE ORAL
Qty: 135 CAPSULE | Refills: 0 | Status: SHIPPED | OUTPATIENT
Start: 2020-11-03 | End: 2021-02-17

## 2020-11-03 ASSESSMENT — PAIN SCALES - GENERAL: PAINLEVEL: NO PAIN (0)

## 2020-11-03 NOTE — PROGRESS NOTES
TELEPHONE VISIT  Zoe Altman is a 19 year old pt. who is being evaluated via a billable telephone visit.      The patient has been notified of the following:    We have found that certain health care needs can be provided without the need for a physical exam. This service lets us provide the care you need with a short phone conversation. If a prescription is necessary we can send it directly to your pharmacy. If lab work is needed we can place an order for that and you can then stop by our lab to have the test done at a later time. Insurers are generally covering virtual visits as they would in-office visits so billing should not be different than normal.  If for some reason you do get billed incorrectly, you should contact the billing office to correct it and that number is in the AVS .    Patient has given verbal consent for a telephone visit?:  Yes   How would the pt like to obtain the AVS?:  not requested  AVS SmartPhrase [PsychAVS] has been placed in 'Patient Instructions':  N/A     Start Time:  2:04          End Time:  2:19pm    Psychiatry Clinic Progress Note                                                                   Zoe Altman is a 20 year old female who returns to the clinic for follow-up care.  Therapist: None  PCP: Cortez Foster  Other Providers: None    Pertinent Background:  See previous notes.  Psych critical item history includes psychosis [sxs include disorganized thought process, auditory and visual hallucinations, and paranoia in context of shanel] and psych hosp (<3).     Interim History                                                                                                        4, 4     The patient is a good historian, reports good treatment adherence and was last seen 10/8/20.  Since the last visit, Decreased Seroquel to 50mg at bedtime.   No changes to symptoms. Mood stable.   Sleep regular.  She is requesting another month of taper of Seroquel and to decrease to 25mg  "at bedtime.      10/8/20:  Zoe reports she is \"good.\" No concerns with depression/shanel.   Sleep is \"good\" and regular.  Has not completed Lithium labs but does not endorse any symptoms associated with toxicity or DI.   Continues to work at [employer].  Continues to be taking time away from school.  Unsure if will return.  Zoe has been without symptoms in over a year she is requesting to decrease her seroquel dose to 50mg at bedtime     5/19/20: Zoe decided to take a semester off from school.  She is living independently in [place].  Working as the  at [employer].  Zoe reports her mood is \"good.\"  No concerns with elevated mood or depression.  Sleep is \"good.\"  Zoe has not been able to get Lithium level in over a year.  Orders have been placed but not completed.  Will consult with pharmacy about possibility of getting phlebotomist to see her at home.     11/21/19: Zoe reports her mood is stable.  Missed last appointments due to busy schedule (work/school).  Reports medication adherence.  Sleep is good and reports no decreased need for sleep.  Reports she is successful at school.  Continues to discuss possibility of decreasing/discontinuing Seroquel.  Decided to try during summer break.  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  No concerns with tremors, polydipsia, or polyuria.      4/30/19: Zoe reports she has no concerns with symptoms of shanel or depression.  She has pulled two separate \"all-nighters\" to complete homework.  Was unable to function the following the day and needed to catch up on sleep.  Sleep overall is good.  Zoe is curious about decreasing/discontinuing Seroquel due to sedation.  Will discuss further at next appointment.      10/26/18: Zoe reports no concerns with symptoms, depression or shanel.  Sleeping regularly.  No excessive energy.  No polydipsia or polysuria.  Reports doing well in school.  " Weight stable. Lithium labs ordered.  Will have done at [facility].      6/13/18: Zoe successfully completed her freshman year of college.  No concerns with her mental health.  Again, no concerns with elevated mood, increased energy, or decreased need for sleep.  She is sleeping regularly.  She does report daytime sedation and requests her Seroquel dose be lowered.  Zoe reports she knows the symptoms of shanel and will call the clinic if she experiences any symptoms.  Provided education on proper hydration and how excessive due to summer temperatures may impact Lithium levels.      4/17/18:  Zoe does not report any changes in her mental health.  She does not report any concerns with elevated mood, increased energy, and decreased need for sleep.  In February, Zoe had difficulty getting her medications filled at pharmacy and experienced difficulty falling asleep.  She went a total of 3 days without medications.  She continues to live at home in [place], [place].  Continue to get along with her siblings.  Zoe reports she continues to do well in school.  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  Zoe continues to work at [employer].      Zoe continues to want to taper off Seroquel due to increased sedation.  Her only concern today is sleeping too much.   No concerns with side effects.  No observable tremors or uncontrolled movements.      Seroquel decreased to 150mg today.  Zoe states her first sign of symptom relapse is lack of sleep.  She will call clinic if she does not sleep for more than a night.       Requested to return to clinic in early June    Recent Symptoms:   Depression:  Not endorsed  Elevated:  none  Psychosis:  none  Anxiety:  not reported  Panic Attack:  none  Trauma Related:  none     Recent Substance Use:  no changes reported        Social/ Family History                                  [per patient report]          "                        1ea,1ea   FINANCIAL SUPPORT- working       LIVING SITUATION- Lives at home with family      FEELS SAFE AT HOME- Yes    Medical / Surgical History                                                                                                                  Patient Active Problem List   Diagnosis     Bipolar I disorder (H)       No past surgical history on file.     Medical Review of Systems                                                                                                    2,10   A comprehensive review of systems was performed and is negative other than noted in the HPI.  Pregnant- No    Breastfeeding- No    Contraception- Yes  Allergy                                Patient has no known allergies.  Current Medications                                                                                                       Current Outpatient Medications   Medication Sig Dispense Refill     lithium 300 MG capsule TAKE 3 CAPSULES BY MOUTH AT BEDTIME 135 capsule 0     Multiple Vitamins-Minerals (WOMENS MULTIVITAMIN PLUS PO) Take by mouth as needed        norgestimate-ethinyl estradiol (ORTHO-CYCLEN, SPRINTEC) 0.25-35 MG-MCG per tablet Take 1 tablet by mouth daily       Omega-3 Fatty Acids (OMEGA-3 FISH OIL PO) Take 1,000 mg by mouth daily       QUEtiapine (SEROQUEL) 25 MG tablet Take 1 tablet (25 mg) by mouth At Bedtime 30 tablet 0     Vitals                                                                                                                       3, 3   There were no vitals taken for this visit.   Mental Status Exam                                                                                    9, 14 cog gs     Alertness: alert  and oriented  Appearance: unable to assess  Behavior/Demeanor: cooperative and pleasant, with unable to assess eye contact   Speech: normal  Language: good  Psychomotor: unable to assess  Mood: \"good\"  Affect: unable to assess; was congruent to " mood; was congruent to content  Thought Process/Associations: unremarkable  Thought Content:  Reports none;  Denies suicidal ideation and violent ideation  Perception:  Reports none;  Denies auditory hallucinations and visual hallucinations  Insight: good  Judgment: good  Cognition: (6) does  appear grossly intact; formal cognitive testing was not done  Gait/Station and/or Muscle Strength/Tone: unremarkable    Labs and Data                                                                                                                 Rating Scales:    PHQ9    PHQ9 Today:  Not completed  PHQ-9 SCORE 6/13/2018 10/26/2018 4/30/2019   PHQ-9 Total Score 0 1 2         Diagnosis and Assessment                                                                             m2, h3     Today the following issues were addressed:    1) Bipolar I Disorder    [place] [] was not checked today:  not using controlled substances.    PSYCHOTROPIC DRUG INTERACTIONS: none clinically relevant    Plan                                                                                                                    m2, h3      1) Medication Management  Continue Lithium 900mg at bedtime  Decrease Seroquel to 25mg at bedtime     Lithium labs needed.  Orders have been placed but not completed     RTC: 6 weeks    CRISIS NUMBERS:   Provided routinely in AVS.    Treatment Risk Statement:  The patient understands the risks, benefits, adverse effects and alternatives. Agrees to treatment with the capacity to do so. No medical contraindications to treatment. Agrees to call clinic for any problems. The patient understands to call 911 or go to the nearest ED if life threatening or urgent symptoms occur.     PROVIDER:  ANGIE Cutler CNP

## 2020-11-03 NOTE — PATIENT INSTRUCTIONS
Thank you for coming to the [facility] MENTAL HEALTH & ADDICTION [place] [facility].    Lab Testing:  If you had lab testing today and your results are reassuring or normal they will be mailed to you or sent through [employer] within 7 days. If the lab tests need quick action we will call you with the results. The phone number we will call with results is # [phone] (home) . If this is not the best number please call our clinic and change the number.    Medication Refills:  If you need any refills please call your pharmacy and they will contact us. Our fax number for refills is [phone]. Please allow three business for refill processing. If you need to  your refill at a new pharmacy, please contact the new pharmacy directly. The new pharmacy will help you get your medications transferred.     Scheduling:  If you have any concerns about today's visit or wish to schedule another appointment please call our office during normal business hours [phone] (8-5:00 M-F)    Contact Us:  Please call [phone] during business hours (8-5:00 M-F).  If after clinic hours, or on the weekend, please call  [phone].    Financial Assistance [phone]  [facility] Billing [phone]  Central Billing Office, [facility]: [phone]  [facility] Billing [phone]  Medical Records [phone]  [facility] [place] Patient Bill of Rights [email or web address]       MENTAL HEALTH CRISIS NUMBERS:  For a medical emergency please call  911 or go to the nearest ER.     [facility]:   [facility] -[phone]   Crisis Residence [facility] -[phone]   Walk-In Counseling Center [facility] -[phone]   COPE 24/7 [place] Mobile Team -[phone] (adults)/[phone] (child)  CHILD: [facility] needs assessment team - [phone]      [facility]:   [facility] - [phone]   Walk-in counseling [facility] - [phone]   Walk-in counseling [facility] - [phone]   Crisis Residence [facility] - [phone]  Urgent Care Adult Mental [facility]-[phone] mobile unit/ 24/7 crisis line    National Crisis Numbers:   National Suicide Prevention Lifeline: 7-075-919-TALK (923-682-8130)  Poison Control Center - 1-480.721.3033  [employer]/resources for a list of additional resources (SOS)  Trans Lifeline a hotline for transgender people 3-435-279-2560  The [employer] Project a hotline for LGBT youth 1-624.233.9603  Crisis Text Line: For any crisis 24/7   To: 919085  see www.crisistextline.org  - IF MAKING A CALL FEELS TOO HARD, send a text!         Again thank you for choosing [facility] MENTAL HEALTH & ADDICTION [facility] and please let us know how we can best partner with you to improve you and your family's health.    You may be receiving a survey regarding this appointment. We would love to have your feedback, both positive and negative. The survey is done by an external company, so your answers are anonymous.

## 2020-12-27 ENCOUNTER — HEALTH MAINTENANCE LETTER (OUTPATIENT)
Age: 20
End: 2020-12-27

## 2020-12-30 DIAGNOSIS — F31.9 BIPOLAR I DISORDER (H): ICD-10-CM

## 2021-01-05 RX ORDER — QUETIAPINE FUMARATE 25 MG/1
TABLET, FILM COATED ORAL
Qty: 30 TABLET | Refills: 0 | Status: SHIPPED | OUTPATIENT
Start: 2021-01-05 | End: 2021-02-17

## 2021-01-05 NOTE — TELEPHONE ENCOUNTER
Medication requested:  QUEtiapine Fumarate 25 MG Oral Tablet  Last refilled: 11/3/20  Qty: 30/0      Last seen: 11/3/20  RTC: 6 weeks  Cancel: 0  No-show: 0  Next appt: 0    Refill decision:   Refilled for 30 days per sunny protocol   Scheduling has been notified to contact the pt for appointment.

## 2021-02-15 DIAGNOSIS — F31.9 BIPOLAR I DISORDER (H): ICD-10-CM

## 2021-02-17 RX ORDER — LITHIUM CARBONATE 300 MG/1
CAPSULE ORAL
Qty: 90 CAPSULE | Refills: 0 | Status: SHIPPED | OUTPATIENT
Start: 2021-02-17 | End: 2021-03-02

## 2021-02-17 RX ORDER — QUETIAPINE FUMARATE 25 MG/1
25 TABLET, FILM COATED ORAL AT BEDTIME
Qty: 30 TABLET | Refills: 0 | Status: SHIPPED | OUTPATIENT
Start: 2021-02-17 | End: 2021-03-02

## 2021-02-17 NOTE — TELEPHONE ENCOUNTER
Medication requested: Lithium Carbonate 300 MG Oral Capsule   Last refilled: 11/3/20  Qty :135/0  QUEtiapine Fumarate 25 MG Oral Tablet  Last refilled: 1/5/2021  Qty: 30/0    Last seen: 11/3/20  RTC: 6 weeks  Cancel: 2/12021( provider)  No-show: 0  Next appt: 0     Refill decision:   Refilled for 30 days per sunny protocol   Scheduling has been notified to contact the pt for appointment.

## 2021-03-02 ENCOUNTER — VIRTUAL VISIT (OUTPATIENT)
Dept: PSYCHIATRY | Facility: CLINIC | Age: 21
End: 2021-03-02
Attending: NURSE PRACTITIONER
Payer: COMMERCIAL

## 2021-03-02 DIAGNOSIS — F31.9 BIPOLAR I DISORDER (H): ICD-10-CM

## 2021-03-02 DIAGNOSIS — Z79.899 ENCOUNTER FOR LONG-TERM (CURRENT) USE OF MEDICATIONS: Primary | ICD-10-CM

## 2021-03-02 PROCEDURE — 99213 OFFICE O/P EST LOW 20 MIN: CPT | Mod: 95 | Performed by: NURSE PRACTITIONER

## 2021-03-02 RX ORDER — QUETIAPINE FUMARATE 25 MG/1
25 TABLET, FILM COATED ORAL AT BEDTIME
Qty: 90 TABLET | Refills: 0 | Status: SHIPPED | OUTPATIENT
Start: 2021-03-02 | End: 2021-08-03

## 2021-03-02 RX ORDER — LITHIUM CARBONATE 300 MG/1
CAPSULE ORAL
Qty: 270 CAPSULE | Refills: 0 | Status: SHIPPED | OUTPATIENT
Start: 2021-03-02 | End: 2021-08-03

## 2021-03-02 ASSESSMENT — PAIN SCALES - GENERAL: PAINLEVEL: NO PAIN (0)

## 2021-03-02 NOTE — PROGRESS NOTES
"Video- Visit Details  Type of service:  video visit for medication management  Time of service:    Date:  03/02/2021    Video Start Time:  4:04 PM        Video End Time:  4:24pm    Reason for video visit:  Patient unable to travel due to Covid-19  Originating Site (patient location):  [facility]   Location- Patient's home  Distant Site (provider location):  Remote location  Mode of Communication:  Video Conference via AmWell  Consent:  Patient has given verbal consent for video visit?: Yes     VIDEO VISIT  Zoe Altman is a 20 year old patient who is being evaluated via a billable video visit.      The patient has been notified of following:   \"This video visit will be conducted via a call between you and your physician/provider. We have found that certain health care needs can be provided without the need for an in-person physical exam. This service lets us provide the care you need with a video conversation. If a prescription is necessary we can send it directly to your pharmacy. If lab work is needed we can place an order for that and you can then stop by our lab to have the test done at a later time. Insurers are generally covering virtual visits as they would in-office visits so billing should not be different than normal.  If for some reason you do get billed incorrectly, you should contact the billing office to correct it and that number is in the AVS .    Video Conference to be completed via:  Amwell    Patient has given verbal consent for video visit?:  Yes    Patient would prefer that any video invitations be sent by: Send to e-mail at: [email or web address]      How would patient like to obtain AVS?:  Aff[employer]    AVS SmartPhrase [PsychAVS] has been placed in 'Patient Instructions':  Yes       Psychiatry Clinic Progress Note                                                                   Zoe Altman is a 20 year old female who returns to the clinic for follow-up care.  Therapist: None  PCP: " "Cortez Foster  Other Providers: None    Pertinent Background:  See previous notes.  Psych critical item history includes psychosis [sxs include disorganized thought process, auditory and visual hallucinations, and paranoia in context of shanel] and psych hosp (<3).     Interim History                                                                                                        4, 4     The patient is a good historian, reports good treatment adherence and was last seen 11/3/20.  Since the last visit, Zoe reports experiencing stressful event at bar in early february.  She inadvertently passed counterfeit $100 bill to , which was tip. In spite of amount, Zoe does not believe tip was impulsive behavior as she works in the service industry.  She was questioned by the police.  Zoe reports she was intoxicated at the time.  Zoe also did not sleep that night.  She attributes to stress and friends snoring.  No symptoms since the night of incident.  Zoe does not endorse any concerns with prolonged shanel and does not want to adjust medications.     11/3/20: Decreased Seroquel to 50mg at bedtime.   No changes to symptoms. Mood stable.   Sleep regular.  She is requesting another month of taper of Seroquel and to decrease to 25mg at bedtime.    10/8/20:  Zoe reports she is \"good.\" No concerns with depression/shanel.   Sleep is \"good\" and regular.  Has not completed Lithium labs but does not endorse any symptoms associated with toxicity or DI.   Continues to work at [employer].  Continues to be taking time away from school.  Unsure if will return.  Zoe has been without symptoms in over a year she is requesting to decrease her seroquel dose to 50mg at bedtime     5/19/20: Zoe decided to take a semester off from school.  She is living independently in [place].  Working as the  at [employer].  Zoe reports her mood is \"good.\"  No concerns with elevated mood or depression.  " "Sleep is \"good.\"  Zoe has not been able to get Lithium level in over a year.  Orders have been placed but not completed.  Will consult with pharmacy about possibility of getting phlebotomist to see her at home.     11/21/19: Zoe reports her mood is stable.  Missed last appointments due to busy schedule (work/school).  Reports medication adherence.  Sleep is good and reports no decreased need for sleep.  Reports she is successful at school.  Continues to discuss possibility of decreasing/discontinuing Seroquel.  Decided to try during summer break.  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  No concerns with tremors, polydipsia, or polyuria.      4/30/19: Zoe reports she has no concerns with symptoms of shanel or depression.  She has pulled two separate \"all-nighters\" to complete homework.  Was unable to function the following the day and needed to catch up on sleep.  Sleep overall is good.  Zoe is curious about decreasing/discontinuing Seroquel due to sedation.  Will discuss further at next appointment.      10/26/18: Zoe reports no concerns with symptoms, depression or shanel.  Sleeping regularly.  No excessive energy.  No polydipsia or polysuria.  Reports doing well in school.  Weight stable. Lithium labs ordered.  Will have done at [facility].      6/13/18: Zoe successfully completed her freshman year of college.  No concerns with her mental health.  Again, no concerns with elevated mood, increased energy, or decreased need for sleep.  She is sleeping regularly.  She does report daytime sedation and requests her Seroquel dose be lowered.  Zoe reports she knows the symptoms of shanel and will call the clinic if she experiences any symptoms.  Provided education on proper hydration and how excessive due to summer temperatures may impact Lithium levels.      4/17/18:  Zoe does not report any changes in her mental health.  She " does not report any concerns with elevated mood, increased energy, and decreased need for sleep.  In February, Zoe had difficulty getting her medications filled at pharmacy and experienced difficulty falling asleep.  She went a total of 3 days without medications.  She continues to live at home in [place], [place].  Continue to get along with her siblings.  Zoe reports she continues to do well in school.  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  Zoe continues to work at [employer].      Zoe continues to want to taper off Seroquel due to increased sedation.  Her only concern today is sleeping too much.   No concerns with side effects.  No observable tremors or uncontrolled movements.      Seroquel decreased to 150mg today.  Zoe states her first sign of symptom relapse is lack of sleep.  She will call clinic if she does not sleep for more than a night.       Requested to return to clinic in early June    Recent Symptoms:   Depression:  Not endorsed  Elevated:  decreased sleep need and excessive spending  Psychosis:  none  Anxiety:  not reported  Panic Attack:  none  Trauma Related:  none     Recent Substance Use:  no changes reported        Social/ Family History                                  [per patient report]                                 1ea,1ea   FINANCIAL SUPPORT- working       LIVING SITUATION- Lives at home with family      FEELS SAFE AT HOME- Yes    Medical / Surgical History                                                                                                                  Patient Active Problem List   Diagnosis     Bipolar I disorder (H)       No past surgical history on file.     Medical Review of Systems                                                                                                    2,10   A comprehensive review of systems was performed and is negative other than noted in the HPI.  Pregnant- No     "Breastfeeding- No    Contraception- Yes  Allergy                                Patient has no known allergies.  Current Medications                                                                                                       Current Outpatient Medications   Medication Sig Dispense Refill     lithium 300 MG capsule TAKE 3 CAPSULES BY MOUTH AT BEDTIME Please call for appt [phone] 90 capsule 0     Multiple Vitamins-Minerals (WOMENS MULTIVITAMIN PLUS PO) Take by mouth as needed        norgestimate-ethinyl estradiol (ORTHO-CYCLEN, SPRINTEC) 0.25-35 MG-MCG per tablet Take 1 tablet by mouth daily       QUEtiapine (SEROQUEL) 25 MG tablet Take 1 tablet (25 mg) by mouth At Bedtime Please call for appt [phone] 30 tablet 0     Omega-3 Fatty Acids (OMEGA-3 FISH OIL PO) Take 1,000 mg by mouth daily       Vitals                                                                                                                       3, 3   There were no vitals taken for this visit.   Mental Status Exam                                                                                    9, 14 cog gs     Alertness: alert  and oriented  Appearance: unable to assess  Behavior/Demeanor: cooperative and pleasant, with unable to assess eye contact   Speech: normal  Language: good  Psychomotor: unable to assess  Mood: \"good\"  Affect: unable to assess; was congruent to mood; was congruent to content  Thought Process/Associations: unremarkable  Thought Content:  Reports none;  Denies suicidal ideation and violent ideation  Perception:  Reports none;  Denies auditory hallucinations and visual hallucinations  Insight: good  Judgment: good  Cognition: (6) does  appear grossly intact; formal cognitive testing was not done  Gait/Station and/or Muscle Strength/Tone: unremarkable    Labs and Data                                                                                                                 Rating Scales:  "   PHQ9    PHQ9 Today:  Not completed  PHQ-9 SCORE 6/13/2018 10/26/2018 4/30/2019   PHQ-9 Total Score 0 1 2         Diagnosis and Assessment                                                                             m2, h3     Today the following issues were addressed:    1) Bipolar I Disorder    [place] Prescription Monitoring Program [] was not checked today:  not using controlled substances.    PSYCHOTROPIC DRUG INTERACTIONS: none clinically relevant    Plan                                                                                                                    m2, h3      1) Medication Management  Continue Lithium 900mg at bedtime  Continue Seroquel to 25mg at bedtime     Lithium labs needed.  Orders have been placed but not completed     RTC: 3 months    CRISIS NUMBERS:   Provided routinely in AVS.    Treatment Risk Statement:  The patient understands the risks, benefits, adverse effects and alternatives. Agrees to treatment with the capacity to do so. No medical contraindications to treatment. Agrees to call clinic for any problems. The patient understands to call 911 or go to the nearest ED if life threatening or urgent symptoms occur.     PROVIDER:  ANGIE Cutler CNP

## 2021-03-02 NOTE — PATIENT INSTRUCTIONS
**For crisis resources, please see the information at the end of this document**     Patient Education      Thank you for coming to the [facility] MENTAL HEALTH & ADDICTION [place] [facility].    Lab Testing:  If you had lab testing today and your results are reassuring or normal they will be mailed to you or sent through [employer] within 7 days. If the lab tests need quick action we will call you with the results. The phone number we will call with results is # [phone] (home) . If this is not the best number please call our clinic and change the number.    Medication Refills:  If you need any refills please call your pharmacy and they will contact us. Our fax number for refills is [phone]. Please allow three business for refill processing. If you need to  your refill at a new pharmacy, please contact the new pharmacy directly. The new pharmacy will help you get your medications transferred.     Scheduling:  If you have any concerns about today's visit or wish to schedule another appointment please call our office during normal business hours [phone] (8-5:00 M-F)    Contact Us:  Please call [phone] during business hours (8-5:00 M-F).  If after clinic hours, or on the weekend, please call  [phone].    Financial Assistance [phone]  [facility] Billing [phone]  Central Billing Office, [facility]: [phone]  [facility] Billing [phone]  Medical Records [phone]  [facility] Patient Bill of Rights [email or web address]       MENTAL HEALTH CRISIS NUMBERS:  For a medical emergency please call  911 or go to the nearest ER.     [facility]:   [facility] -[phone]   Crisis Residence [facility] -[phone]   Walk-In Counseling Center [facility] -[phone]   COPE 24/7 [place] Mobile Team -[phone] (adults)/[phone] (child)  CHILD: [facility] needs assessment  team - [phone]      [facility]:   [facility] - [phone]   Walk-in counseling [facility] - [phone]   Walk-in counseling [facility] - [phone]   Crisis Residence [facility] - [phone]  Urgent Care Adult Mental [facility]-[phone] mobile unit/ 24/7 crisis line    National Crisis Numbers:   National Suicide Prevention Lifeline: 6-717-322-TALK (418-326-4779)  Poison Control Center - 9-297-731-5077  [employer]/resources for a list of additional resources (SOS)  Trans Lifeline a hotline for transgender people 1-271.166.8130  The [employer] Project a hotline for LGBT youth 1-983-554-2088  Crisis Text Line: For any crisis 24/7   To: 018212  see www.crisistextline.org  - IF MAKING A CALL FEELS TOO HARD, send a text!         Again thank you for choosing [facility] MENTAL HEALTH & ADDICTION [facility] and please let us know how we can best partner with you to improve you and your family's health.    You may be receiving a survey regarding this appointment. We would love to have your feedback, both positive and negative. The survey is done by an external company, so your answers are anonymous.

## 2021-03-29 ENCOUNTER — TELEPHONE (OUTPATIENT)
Dept: PSYCHIATRY | Facility: CLINIC | Age: 21
End: 2021-03-29

## 2021-03-29 NOTE — TELEPHONE ENCOUNTER
"Patient calling because medication \"on hold\" per pharmacy.  Writer called pharmacy who stated that medication are ready to be picked up.  Writer informed patient who will  medications.  "

## 2021-04-01 ENCOUNTER — TELEPHONE (OUTPATIENT)
Dept: PSYCHIATRY | Facility: CLINIC | Age: 21
End: 2021-04-01

## 2021-04-01 NOTE — TELEPHONE ENCOUNTER
----- Message from ANGIE Reynolds CNP sent at 4/1/2021  1:12 PM CDT -----  Hello,    Could someone please call Zoe and remind her to get Labs done.  She is very overdue!    Thanks    Chandra

## 2021-04-01 NOTE — TELEPHONE ENCOUNTER
Patient stated that she has tried to get labs drawn a couple times but the lab has told her that they don't have orders.    Writer called lab at [facility] in [place] and they are not able to draw labs on patient who have not established care with them.    At the advice of the clinic, writer called [facility], which takes walk ins and writer faxed orders to them and patient agreed to do them early next week. Labs faxed via Epic.  Patient request that information about where to go for the labs be sent to her [employer].  Writer sent [employer] message with information.

## 2021-04-13 ENCOUNTER — VIRTUAL VISIT (OUTPATIENT)
Dept: PSYCHIATRY | Facility: CLINIC | Age: 21
End: 2021-04-13
Attending: NURSE PRACTITIONER
Payer: COMMERCIAL

## 2021-04-13 DIAGNOSIS — Z79.899 ENCOUNTER FOR LONG-TERM (CURRENT) USE OF MEDICATIONS: ICD-10-CM

## 2021-04-13 DIAGNOSIS — Z51.81 ENCOUNTER FOR THERAPEUTIC DRUG MONITORING: Primary | ICD-10-CM

## 2021-04-13 PROCEDURE — 99213 OFFICE O/P EST LOW 20 MIN: CPT | Mod: 95 | Performed by: NURSE PRACTITIONER

## 2021-04-13 ASSESSMENT — PATIENT HEALTH QUESTIONNAIRE - PHQ9
10. IF YOU CHECKED OFF ANY PROBLEMS, HOW DIFFICULT HAVE THESE PROBLEMS MADE IT FOR YOU TO DO YOUR WORK, TAKE CARE OF THINGS AT HOME, OR GET ALONG WITH OTHER PEOPLE: NOT DIFFICULT AT ALL
SUM OF ALL RESPONSES TO PHQ QUESTIONS 1-9: 2
SUM OF ALL RESPONSES TO PHQ QUESTIONS 1-9: 2

## 2021-04-13 ASSESSMENT — PAIN SCALES - GENERAL: PAINLEVEL: NO PAIN (0)

## 2021-04-13 NOTE — PROGRESS NOTES
"Video- Visit Details  Type of service:  video visit for medication management  Time of service:    Date:  04/13/2021    Video Start Time:  3:34 PM        Video End Time:  3:49pm    Reason for video visit:  Patient unable to travel due to Covid-19  Originating Site (patient location):  [facility]   Location- Patient's home  Distant Site (provider location):  Remote location  Mode of Communication:  Video Conference via AmWell  Consent:  Patient has given verbal consent for video visit?: Yes     VIDEO VISIT  Zoe Altman is a 20 year old patient who is being evaluated via a billable video visit.      The patient has been notified of following:   \"This video visit will be conducted via a call between you and your physician/provider. We have found that certain health care needs can be provided without the need for an in-person physical exam. This service lets us provide the care you need with a video conversation. If a prescription is necessary we can send it directly to your pharmacy. If lab work is needed we can place an order for that and you can then stop by our lab to have the test done at a later time. Insurers are generally covering virtual visits as they would in-office visits so billing should not be different than normal.  If for some reason you do get billed incorrectly, you should contact the billing office to correct it and that number is in the AVS .    Video Conference to be completed via:  Amwell    Patient has given verbal consent for video visit?:  Yes    Patient would prefer that any video invitations be sent by: Send to e-mail at: [email or web address]      How would patient like to obtain AVS?:  [address]    AVS SmartPhrase [PsychAVS] has been placed in 'Patient Instructions':  Yes     Video- Visit Details  Type of service:  video visit for medication management  Time of service:    Date:  04/13/2021    Video Start Time:  3:34 PM        Video End Time:  4:24pm    Reason for video visit:  Patient " "unable to travel due to Covid-19  Originating Site (patient location):  [facility] [place]   Location- Patient's home  Distant Site (provider location):  Remote location  Mode of Communication:  Video Conference via AmWell  Consent:  Patient has given verbal consent for video visit?: Yes     VIDEO VISIT  Zoe Altman is a 20 year old patient who is being evaluated via a billable video visit.      The patient has been notified of following:   \"This video visit will be conducted via a call between you and your physician/provider. We have found that certain health care needs can be provided without the need for an in-person physical exam. This service lets us provide the care you need with a video conversation. If a prescription is necessary we can send it directly to your pharmacy. If lab work is needed we can place an order for that and you can then stop by our lab to have the test done at a later time. Insurers are generally covering virtual visits as they would in-office visits so billing should not be different than normal.  If for some reason you do get billed incorrectly, you should contact the billing office to correct it and that number is in the AVS .    Video Conference to be completed via:  INFIMET    Patient has given verbal consent for video visit?:  Yes    Patient would prefer that any video invitations be sent by: Send to e-mail at: [email or web address]      How would patient like to obtain AVS?:  MyChart    AVS SmartPhrase [PsychAVS] has been placed in 'Patient Instructions':  Yes       Psychiatry Clinic Progress Note                                                                   Zoe Altman is a 20 year old female who returns to the clinic for follow-up care.  Therapist: None  PCP: Cortez Foster  Other Providers: None    Pertinent Background:  See previous notes.  Psych critical item history includes psychosis [sxs include disorganized thought process, auditory and visual hallucinations, and " "paranoia in context of shanel] and psych hosp (<3).     Interim History                                                                                                        4, 4     The patient is a good historian, reports good treatment adherence and was last seen 3/2/21.  Since the last visit,  Zoe reports she is \"good.\" Has not experienced any elevated mood or decreased need for sleep.  Mood stable.  She wanted to check in today as the episode she experienced in early February.  No concerns currently.  She has not received a citation in mail for inadvertently passing $100.  Zoe has not completed blood work but plans to next week.      3/2/21: Zoe reports experiencing stressful event at bar in early february.  She inadvertently passed counterfeit $100 bill to , which was tip. In spite of amount, Zoe does not believe tip was impulsive behavior as she works in the service industry.  She was questioned by the police.  Zoe reports she was intoxicated at the time.  Zoe also did not sleep that night.  She attributes to stress and friends snoring.  No symptoms since the night of incident.  Zoe does not endorse any concerns with prolonged shanel and does not want to adjust medications.     11/3/20: Decreased Seroquel to 50mg at bedtime.   No changes to symptoms. Mood stable.   Sleep regular.  She is requesting another month of taper of Seroquel and to decrease to 25mg at bedtime.    10/8/20:  Zoe reports she is \"good.\" No concerns with depression/shanel.   Sleep is \"good\" and regular.  Has not completed Lithium labs but does not endorse any symptoms associated with toxicity or DI.   Continues to work at [employer].  Continues to be taking time away from school.  Unsure if will return.  Zoe has been without symptoms in over a year she is requesting to decrease her seroquel dose to 50mg at bedtime     5/19/20: Zoe decided to take a semester off from school.  She is living independently in " [place].  Working as the  at [employer].  Zoe reports her mood is \"good.\"  No concerns with elevated mood or depression.  Sleep is \"good.\"  Zoe has not been able to get Lithium level in over a year.  Orders have been placed but not completed.  Will consult with pharmacy about possibility of getting phlebotomist to see her at home.     11/21/19: Zoe reports her mood is stable.  Missed last appointments due to busy schedule (work/school).  Reports medication adherence.  Sleep is good and reports no decreased need for sleep.  Reports she is successful at school.  Continues to discuss possibility of decreasing/discontinuing Seroquel.  Decided to try during summer break.  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  No concerns with tremors, polydipsia, or polyuria.      4/30/19: Zoe reports she has no concerns with symptoms of shanel or depression.  She has pulled two separate \"all-nighters\" to complete homework.  Was unable to function the following the day and needed to catch up on sleep.  Sleep overall is good.  Zoe is curious about decreasing/discontinuing Seroquel due to sedation.  Will discuss further at next appointment.      10/26/18: Zoe reports no concerns with symptoms, depression or shanel.  Sleeping regularly.  No excessive energy.  No polydipsia or polysuria.  Reports doing well in school.  Weight stable. Lithium labs ordered.  Will have done at [facility].      6/13/18: Zoe successfully completed her freshman year of college.  No concerns with her mental health.  Again, no concerns with elevated mood, increased energy, or decreased need for sleep.  She is sleeping regularly.  She does report daytime sedation and requests her Seroquel dose be lowered.  Zoe reports she knows the symptoms of shanel and will call the clinic if she experiences any symptoms.  Provided education on proper hydration and " how excessive due to summer temperatures may impact Lithium levels.      4/17/18:  Zoe does not report any changes in her mental health.  She does not report any concerns with elevated mood, increased energy, and decreased need for sleep.  In February, Zoe had difficulty getting her medications filled at pharmacy and experienced difficulty falling asleep.  She went a total of 3 days without medications.  She continues to live at home in [place], [place].  Continue to get along with her siblings.  Zoe reports she continues to do well in school.  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  Zoe continues to work at [employer].      Zoe continues to want to taper off Seroquel due to increased sedation.  Her only concern today is sleeping too much.   No concerns with side effects.  No observable tremors or uncontrolled movements.      Seroquel decreased to 150mg today.  Zoe states her first sign of symptom relapse is lack of sleep.  She will call clinic if she does not sleep for more than a night.       Requested to return to clinic in early June    Recent Symptoms:   Depression:  Not endorsed  Elevated:  none  Psychosis:  none  Anxiety:  not reported  Panic Attack:  none  Trauma Related:  none     Recent Substance Use:  no changes reported        Social/ Family History                                  [per patient report]                                 1ea,1ea   FINANCIAL SUPPORT- working       LIVING SITUATION- Lives at home with family      FEELS SAFE AT HOME- Yes    Medical / Surgical History                                                                                                                  Patient Active Problem List   Diagnosis     Bipolar I disorder (H)       No past surgical history on file.     Medical Review of Systems                                                                                                    2,10   A  "comprehensive review of systems was performed and is negative other than noted in the HPI.  Pregnant- No    Breastfeeding- No    Contraception- Yes  Allergy                                Patient has no known allergies.  Current Medications                                                                                                       Current Outpatient Medications   Medication Sig Dispense Refill     lithium 300 MG capsule TAKE 3 CAPSULES BY MOUTH AT BEDTIME 270 capsule 0     Multiple Vitamins-Minerals (WOMENS MULTIVITAMIN PLUS PO) Take by mouth as needed        norgestimate-ethinyl estradiol (ORTHO-CYCLEN, SPRINTEC) 0.25-35 MG-MCG per tablet Take 1 tablet by mouth daily       QUEtiapine (SEROQUEL) 25 MG tablet Take 1 tablet (25 mg) by mouth At Bedtime 90 tablet 0     Omega-3 Fatty Acids (OMEGA-3 FISH OIL PO) Take 1,000 mg by mouth daily       Vitals                                                                                                                       3, 3   There were no vitals taken for this visit.   Mental Status Exam                                                                                    9, 14 cog gs     Alertness: alert  and oriented  Appearance: casually groomed  Behavior/Demeanor: cooperative, pleasant and calm, with good  eye contact   Speech: normal  Language: good  Psychomotor: normal or unremarkable  Mood: \"good\"  Affect: appropriate; was congruent to mood; was congruent to content  Thought Process/Associations: unremarkable  Thought Content:  Reports none;  Denies suicidal ideation and violent ideation  Perception:  Reports none;  Denies auditory hallucinations and visual hallucinations  Insight: good  Judgment: good  Cognition: (6) does  appear grossly intact; formal cognitive testing was not done  Gait/Station and/or Muscle Strength/Tone: unremarkable    Labs and Data                                                                                                         "         Rating Scales:    PHQ9    PHQ9 Today:  Not completed  PHQ-9 SCORE 10/26/2018 4/30/2019 4/13/2021   PHQ-9 Total Score MyChart - - 2 (Minimal depression)   PHQ-9 Total Score 1 2 2         Diagnosis and Assessment                                                                             m2, h3     Today the following issues were addressed:    1) Bipolar I Disorder    [place] Prescription Monitoring Program [] was not checked today:  not using controlled substances.    PSYCHOTROPIC DRUG INTERACTIONS: none clinically relevant    Plan                                                                                                                    m2, h3      1) Medication Management  Continue Lithium 900mg at bedtime  Continue Seroquel 25mg at bedtime     Lithium labs needed.  Orders have been placed but not completed     RTC: 3 months    CRISIS NUMBERS:   Provided routinely in AVS.    Treatment Risk Statement:  The patient understands the risks, benefits, adverse effects and alternatives. Agrees to treatment with the capacity to do so. No medical contraindications to treatment. Agrees to call clinic for any problems. The patient understands to call 911 or go to the nearest ED if life threatening or urgent symptoms occur.     PROVIDER:  ANGIE Cutler CNP

## 2021-04-13 NOTE — PATIENT INSTRUCTIONS
**For crisis resources, please see the information at the end of this document**     Patient Education      Thank you for coming to the [facility] MENTAL HEALTH & ADDICTION [place] CLINIC.    Lab Testing:  If you had lab testing today and your results are reassuring or normal they will be mailed to you or sent through InfiKno within 7 days. If the lab tests need quick action we will call you with the results. The phone number we will call with results is # [phone] (home) . If this is not the best number please call our clinic and change the number.    Medication Refills:  If you need any refills please call your pharmacy and they will contact us. Our fax number for refills is [phone]. Please allow three business for refill processing. If you need to  your refill at a new pharmacy, please contact the new pharmacy directly. The new pharmacy will help you get your medications transferred.     Scheduling:  If you have any concerns about today's visit or wish to schedule another appointment please call our office during normal business hours [phone] (8-5:00 M-F)    Contact Us:  Please call [phone] during business hours (8-5:00 M-F).  If after clinic hours, or on the weekend, please call  [phone].    Financial Assistance [phone]  [facility] Billing [phone]  Central Billing Office, [facility]: [phone]  [facility] Billing [phone]  Medical Records [phone]  [facility] Patient Bill of Rights [email or web address]       MENTAL HEALTH CRISIS NUMBERS:  For a medical emergency please call  911 or go to the nearest ER.     [facility]:   [facility] -[phone]   Crisis Residence [facility] Residence -[phone]   Walk-In Counseling Center [facility] -[phone]   COPE 24/7 [place] Mobile Team -[phone] (adults)/[phone] (child)  CHILD: [facility] needs assessment  team - [phone]      [facility]:   [facility] - [phone]   Walk-in counseling [facility] House - [phone]   Walk-in counseling [facility] - [phone]   Crisis Residence [facility] Residence - [phone]  Urgent Care Adult Mental Lsmdhh-[phone] mobile unit/ 24/7 crisis line    National Crisis Numbers:   National Suicide Prevention Lifeline: 9-824-200-TALK (005-126-8715)  Poison Control Center - 2-890-120-0373  PublikDemand/resources for a list of additional resources (SOS)  Trans Lifeline a hotline for transgender people 1-136.969.7464  The [employer] Project a hotline for LGBT youth 2-917-446-3547  Crisis Text Line: For any crisis 24/7   To: 394466  see www.crisistextline.org  - IF MAKING A CALL FEELS TOO HARD, send a text!         Again thank you for choosing [facility] MENTAL HEALTH & ADDICTION [facility] and please let us know how we can best partner with you to improve you and your family's health.    You may be receiving a survey regarding this appointment. We would love to have your feedback, both positive and negative. The survey is done by an external company, so your answers are anonymous.

## 2021-04-14 ASSESSMENT — PATIENT HEALTH QUESTIONNAIRE - PHQ9: SUM OF ALL RESPONSES TO PHQ QUESTIONS 1-9: 2

## 2021-04-24 ENCOUNTER — HEALTH MAINTENANCE LETTER (OUTPATIENT)
Age: 21
End: 2021-04-24

## 2021-07-27 DIAGNOSIS — F31.9 BIPOLAR I DISORDER (H): ICD-10-CM

## 2021-07-28 NOTE — TELEPHONE ENCOUNTER
Writer called the pt to confirm if she is still taking her medications and to ask that she completes the lab tests. Pt answered, but asked to call this writer back, as she's currently at work. Will await her return phone call.    Detail Level: Detailed

## 2021-07-28 NOTE — TELEPHONE ENCOUNTER
Medication requested: Lithium Carbonate 300 MG Oral Capsule  QUEtiapine Fumarate 25 MG Oral Tablet  Last refilled: 3-29-21  Qty: 90 day      Last seen: 4-13-21  RTC: 3 month  Cancel: 0  No-show: 0  Next appt: none    Last clinic note: Lithium labs needed.  Orders have been placed but not completed     Refill decision:   GERII to clinic RN: no lab results found     ( Multiple labs order, Seroquel- Med refill labs due)      Scheduling has been notified to contact the pt for appointment.

## 2021-08-02 RX ORDER — QUETIAPINE FUMARATE 25 MG/1
25 TABLET, FILM COATED ORAL AT BEDTIME
Qty: 30 TABLET | OUTPATIENT
Start: 2021-08-02

## 2021-08-02 RX ORDER — LITHIUM CARBONATE 300 MG/1
CAPSULE ORAL
Qty: 90 CAPSULE | OUTPATIENT
Start: 2021-08-02

## 2021-08-02 NOTE — TELEPHONE ENCOUNTER
Catherine Stanton, Lupis Persaud, RN  Nikita Baugh,     I received refill request for Zoe's Seroquel 25 mg tab. I see you're working on the Lithium refill. Is the Seroquel included in that encounter also?   Zoe picked up both on 7/27/21 qty 30 each, so she is good until 8/27/21.   Let me know     Thanks     Catherine

## 2021-08-03 ENCOUNTER — VIRTUAL VISIT (OUTPATIENT)
Dept: PSYCHIATRY | Facility: CLINIC | Age: 21
End: 2021-08-03
Attending: NURSE PRACTITIONER
Payer: COMMERCIAL

## 2021-08-03 DIAGNOSIS — F31.9 BIPOLAR I DISORDER (H): ICD-10-CM

## 2021-08-03 PROCEDURE — 99214 OFFICE O/P EST MOD 30 MIN: CPT | Mod: 95 | Performed by: NURSE PRACTITIONER

## 2021-08-03 RX ORDER — LITHIUM CARBONATE 300 MG/1
CAPSULE ORAL
Qty: 270 CAPSULE | Refills: 0 | Status: SHIPPED | OUTPATIENT
Start: 2021-08-03 | End: 2021-11-09

## 2021-08-03 RX ORDER — QUETIAPINE FUMARATE 25 MG/1
25 TABLET, FILM COATED ORAL AT BEDTIME
Qty: 90 TABLET | Refills: 0 | Status: SHIPPED | OUTPATIENT
Start: 2021-08-03 | End: 2021-11-09

## 2021-08-03 ASSESSMENT — PAIN SCALES - GENERAL: PAINLEVEL: NO PAIN (0)

## 2021-08-03 NOTE — PATIENT INSTRUCTIONS
**For crisis resources, please see the information at the end of this document**     Patient Education      Thank you for coming to the [facility] MENTAL HEALTH & ADDICTION [place] CLINIC.    Lab Testing:  If you had lab testing today and your results are reassuring or normal they will be mailed to you or sent through LatinComics within 7 days. If the lab tests need quick action we will call you with the results. The phone number we will call with results is # [phone] (home) . If this is not the best number please call our clinic and change the number.    Medication Refills:  If you need any refills please call your pharmacy and they will contact us. Our fax number for refills is [phone]. Please allow three business for refill processing. If you need to  your refill at a new pharmacy, please contact the new pharmacy directly. The new pharmacy will help you get your medications transferred.     Scheduling:  If you have any concerns about today's visit or wish to schedule another appointment please call our office during normal business hours [phone] (8-5:00 M-F)    Contact Us:  Please call [phone] during business hours (8-5:00 M-F).  If after clinic hours, or on the weekend, please call  [phone].    Financial Assistance [phone]  [facility] Billing [phone]  Central Billing Office, [facility]: [phone]  [facility] Billing [phone]  Medical Records [phone]  [facility] Patient Bill of Rights [email or web address]       MENTAL HEALTH CRISIS NUMBERS:  For a medical emergency please call  911 or go to the nearest ER.     [facility]:   [facility] -[phone]   Crisis Residence [facility] Residence -[phone]   Walk-In Counseling Center [facility] -[phone]   COPE 24/7 [facility] Mobile Team -[phone] (adults)/[phone] (child)  CHILD: [facility] needs assessment  team - [phone]      [facility]:   [facility] - [phone]   Walk-in counseling [facility] - [phone]   Walk-in counseling [facility] - [phone]   Crisis Residence [facility] Residence - [phone]  Urgent Care Adult Mental Dgpajb-[phone] mobile unit/ 24/7 crisis line    National Crisis Numbers:   National Suicide Prevention Lifeline: 4-663-008-TALK (176-290-3053)  Poison Control Center - 4-309-499-6037  [employer]iku/resources for a list of additional resources (SOS)  Trans Lifeline a hotline for transgender people 1-973.448.6834  The Manuelito Project a hotline for LGBT youth 7-207-552-2680  Crisis Text Line: For any crisis 24/7   To: 505624  see www.crisistextline.org  - IF MAKING A CALL FEELS TOO HARD, send a text!         Again thank you for choosing [facility] MENTAL HEALTH & ADDICTION [facility] and please let us know how we can best partner with you to improve you and your family's health.    You may be receiving a survey regarding this appointment. We would love to have your feedback, both positive and negative. The survey is done by an external company, so your answers are anonymous.

## 2021-08-03 NOTE — PROGRESS NOTES
"Video- Visit Details  Type of service:  video visit for medication management  Time of service:    Date:  08/03/2021    Video Start Time:  3:37 PM        Video End Time:  3:50pm    Reason for video visit:  Patient unable to travel due to Covid-19  Originating Site (patient location):  [facility]   Location- Patient's home  Distant Site (provider location):  Remote location  Mode of Communication:  Video Conference via AmWell  Consent:  Patient has given verbal consent for video visit?: Yes     VIDEO VISIT  Zoe Altman is a 20 year old patient who is being evaluated via a billable video visit.      The patient has been notified of following:   \"This video visit will be conducted via a call between you and your physician/provider. We have found that certain health care needs can be provided without the need for an in-person physical exam. This service lets us provide the care you need with a video conversation. If a prescription is necessary we can send it directly to your pharmacy. If lab work is needed we can place an order for that and you can then stop by our lab to have the test done at a later time. Insurers are generally covering virtual visits as they would in-office visits so billing should not be different than normal.  If for some reason you do get billed incorrectly, you should contact the billing office to correct it and that number is in the AVS .    Video Conference to be completed via:  Amwell    Patient has given verbal consent for video visit?:  Yes    Patient would prefer that any video invitations be sent by: Send to e-mail at: [email or web address]      How would patient like to obtain AVS?:  Ready To Travel    AVS SmartPhrase [PsychAVS] has been placed in 'Patient Instructions':  Yes       Psychiatry Clinic Progress Note                                                                   Zoe Altman is a 20 year old female who returns to the clinic for follow-up care.  Therapist: None  PCP: " "Cortez Foster  Other Providers: None    Pertinent Background:  See previous notes.  Psych critical item history includes psychosis [sxs include disorganized thought process, auditory and visual hallucinations, and paranoia in context of shanel] and psych hosp (<3).     Interim History                                                                                                        4, 4     The patient is a good historian, reports good treatment adherence and was last seen 4/13/21.  Since the last visit, Zoe reports she is \"good.\"  Continues to work full time at [employer].  No concern with elevated mood or other symptoms of shanel.  Fortunately, she did not receive any legal ramifications from event at [employer] in spring.  No concerns with sleep.  Blood draw completed.  Lithium level 7/2/21: 0.8.      4/13/21: Zoe reports she is \"good.\" Has not experienced any elevated mood or decreased need for sleep.  Mood stable.  She wanted to check in today as the episode she experienced in early February.  No concerns currently.  She has not received a citation in mail for inadvertently passing $100.  Zoe has not completed blood work but plans to next week.      3/2/21: Zoe reports experiencing stressful event at bar in early february.  She inadvertently passed counterfeit $100 bill to , which was tip. In spite of amount, Zoe does not believe tip was impulsive behavior as she works in the service industry.  She was questioned by the police.  Zoe reports she was intoxicated at the time.  Zoe also did not sleep that night.  She attributes to stress and friends snoring.  No symptoms since the night of incident.  Zoe does not endorse any concerns with prolonged shanel and does not want to adjust medications.     11/3/20: Decreased Seroquel to 50mg at bedtime.   No changes to symptoms. Mood stable.   Sleep regular.  She is requesting another month of taper of Seroquel and to decrease to 25mg at " "bedtime.    10/8/20:  Zoe reports she is \"good.\" No concerns with depression/shanel.   Sleep is \"good\" and regular.  Has not completed Lithium labs but does not endorse any symptoms associated with toxicity or DI.   Continues to work at [employer].  Continues to be taking time away from school.  Unsure if will return.  Zoe has been without symptoms in over a year she is requesting to decrease her seroquel dose to 50mg at bedtime     5/19/20: Zoe decided to take a semester off from school.  She is living independently in [place].  Working as the  at [employer].  Zoe reports her mood is \"good.\"  No concerns with elevated mood or depression.  Sleep is \"good.\"  Zoe has not been able to get Lithium level in over a year.  Orders have been placed but not completed.  Will consult with pharmacy about possibility of getting phlebotomist to see her at home.     11/21/19: Zoe reports her mood is stable.  Missed last appointments due to busy schedule (work/school).  Reports medication adherence.  Sleep is good and reports no decreased need for sleep.  Reports she is successful at school.  Continues to discuss possibility of decreasing/discontinuing Seroquel.  Decided to try during summer break.  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  No concerns with tremors, polydipsia, or polyuria.      4/30/19: Zoe reports she has no concerns with symptoms of shanel or depression.  She has pulled two separate \"all-nighters\" to complete homework.  Was unable to function the following the day and needed to catch up on sleep.  Sleep overall is good.  Zoe is curious about decreasing/discontinuing Seroquel due to sedation.  Will discuss further at next appointment.      10/26/18: Zoe reports no concerns with symptoms, depression or shanel.  Sleeping regularly.  No excessive energy.  No polydipsia or polysuria.  Reports doing well in school.  Weight " stable. Lithium labs ordered.  Will have done at [facility] in [facility].      6/13/18: Zoe successfully completed her freshman year of college.  No concerns with her mental health.  Again, no concerns with elevated mood, increased energy, or decreased need for sleep.  She is sleeping regularly.  She does report daytime sedation and requests her Seroquel dose be lowered.  Zoe reports she knows the symptoms of shanel and will call the clinic if she experiences any symptoms.  Provided education on proper hydration and how excessive due to summer temperatures may impact Lithium levels.      4/17/18:  Zoe does not report any changes in her mental health.  She does not report any concerns with elevated mood, increased energy, and decreased need for sleep.  In February, Zoe had difficulty getting her medications filled at pharmacy and experienced difficulty falling asleep.  She went a total of 3 days without medications.  She continues to live at home in [place], [place].  Continue to get along with her siblings.  Zoe reports she continues to do well in school.  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  Zoe continues to work at [employer].      Zoe continues to want to taper off Seroquel due to increased sedation.  Her only concern today is sleeping too much.   No concerns with side effects.  No observable tremors or uncontrolled movements.      Seroquel decreased to 150mg today.  Zoe states her first sign of symptom relapse is lack of sleep.  She will call clinic if she does not sleep for more than a night.       Requested to return to clinic in early June    Recent Symptoms:   Depression:  Not endorsed  Elevated:  none  Psychosis:  none  Anxiety:  not reported  Panic Attack:  none  Trauma Related:  none     Recent Substance Use:  no changes reported        Social/ Family History                                  [per patient report]                 "                 1ea,1ea   FINANCIAL SUPPORT- working       LIVING SITUATION- Lives at home with family      FEELS SAFE AT HOME- Yes    Medical / Surgical History                                                                                                                  Patient Active Problem List   Diagnosis     Bipolar I disorder (H)       No past surgical history on file.     Medical Review of Systems                                                                                                    2,10   A comprehensive review of systems was performed and is negative other than noted in the HPI.  Pregnant- No    Breastfeeding- No    Contraception- Yes  Allergy                                Patient has no known allergies.  Current Medications                                                                                                       Current Outpatient Medications   Medication Sig Dispense Refill     lithium 300 MG capsule TAKE 3 CAPSULES BY MOUTH AT BEDTIME 270 capsule 0     Multiple Vitamins-Minerals (WOMENS MULTIVITAMIN PLUS PO) Take by mouth as needed        norgestimate-ethinyl estradiol (ORTHO-CYCLEN, SPRINTEC) 0.25-35 MG-MCG per tablet Take 1 tablet by mouth daily       QUEtiapine (SEROQUEL) 25 MG tablet Take 1 tablet (25 mg) by mouth At Bedtime 90 tablet 0     Omega-3 Fatty Acids (OMEGA-3 FISH OIL PO) Take 1,000 mg by mouth daily       Vitals                                                                                                                       3, 3   There were no vitals taken for this visit.   Mental Status Exam                                                                                    9, 14 cog gs     Alertness: alert  and oriented  Appearance: casually groomed  Behavior/Demeanor: cooperative, pleasant and calm, with good  eye contact   Speech: regular rate and rhythm  Language: no problems  Psychomotor: normal or unremarkable  Mood: \"good\"  Affect: appropriate; was " congruent to mood; was congruent to content  Thought Process/Associations: unremarkable  Thought Content:  Reports none;  Denies suicidal ideation and violent ideation  Perception:  Reports none;  Denies auditory hallucinations and visual hallucinations  Insight: good  Judgment: good  Cognition: (6) does  appear grossly intact; formal cognitive testing was not done  Gait/Station and/or Muscle Strength/Tone: unremarkable    Labs and Data                                                                                                                 Rating Scales:    PHQ9    PHQ9 Today:  Not completed  PHQ-9 SCORE 4/30/2019 4/13/2021 8/3/2021   PHQ-9 Total Score MyChart - 2 (Minimal depression) 2 (Minimal depression)   PHQ-9 Total Score 2 2 2         Diagnosis and Assessment                                                                             m2, h3     Today the following issues were addressed:    1) Bipolar I Disorder    [place] Prescription Monitoring Program [] was not checked today:  not using controlled substances.    PSYCHOTROPIC DRUG INTERACTIONS: none clinically relevant    Plan                                                                                                                    m2, h3      1) Medication Management  Continue Lithium 900mg at bedtime  Continue Seroquel 25mg at bedtime     [place] labs complted: 7/2/21- 0.8    RTC: 3 months    CRISIS NUMBERS:   Provided routinely in AVS.    Treatment Risk Statement:  The patient understands the risks, benefits, adverse effects and alternatives. Agrees to treatment with the capacity to do so. No medical contraindications to treatment. Agrees to call clinic for any problems. The patient understands to call 911 or go to the nearest ED if life threatening or urgent symptoms occur.     PROVIDER:  ANGIE Cutler CNP

## 2021-08-04 ASSESSMENT — PATIENT HEALTH QUESTIONNAIRE - PHQ9: SUM OF ALL RESPONSES TO PHQ QUESTIONS 1-9: 2

## 2021-10-09 ENCOUNTER — HEALTH MAINTENANCE LETTER (OUTPATIENT)
Age: 21
End: 2021-10-09

## 2021-11-07 DIAGNOSIS — F31.9 BIPOLAR I DISORDER (H): ICD-10-CM

## 2021-11-09 RX ORDER — LITHIUM CARBONATE 300 MG/1
CAPSULE ORAL
Qty: 90 CAPSULE | Refills: 0 | Status: SHIPPED | OUTPATIENT
Start: 2021-11-09 | End: 2021-12-17

## 2021-11-09 RX ORDER — QUETIAPINE FUMARATE 25 MG/1
25 TABLET, FILM COATED ORAL AT BEDTIME
Qty: 30 TABLET | Refills: 0 | Status: SHIPPED | OUTPATIENT
Start: 2021-11-09 | End: 2021-12-17

## 2021-11-09 NOTE — TELEPHONE ENCOUNTER
LITHIUM 300MG  Last refilled: 8/3/21  Qty: 270  QUEtiapine 25 MG    Last refilled: 8/3/21  Qty: 90    Last seen: 8/3/21  RTC: 3 MOS  Cancel: 0  No-show: 0  Next appt: NONE  Scheduling has been notified to contact the pt for appointment.  30 day sunny refill sent to the pharmacy - including instructions for patient to call the clinic and schedule an appointment.

## 2021-12-15 DIAGNOSIS — F31.9 BIPOLAR I DISORDER (H): ICD-10-CM

## 2021-12-17 NOTE — TELEPHONE ENCOUNTER
LITHIUM 300MG  Last refilled: 11/9/21  Qty: 90    QUEtiapine 25 MG    Last refilled: 11/9/21  Qty: 30        Last seen: 8/3/21-Chandra Herrera  RTC: 3 MOS  Cancel: 0  No-show: 0  Next appt: 0    Refill pended and routed to the provider/RN for review/determination due to   Provider not availain  Unclear if pt has a new provider

## 2021-12-20 RX ORDER — QUETIAPINE FUMARATE 25 MG/1
25 TABLET, FILM COATED ORAL AT BEDTIME
Qty: 30 TABLET | Refills: 0 | Status: SHIPPED | OUTPATIENT
Start: 2021-12-20 | End: 2022-01-28

## 2021-12-20 RX ORDER — LITHIUM CARBONATE 300 MG/1
900 CAPSULE ORAL AT BEDTIME
Qty: 90 CAPSULE | Refills: 0 | Status: SHIPPED | OUTPATIENT
Start: 2021-12-20 | End: 2022-01-28

## 2021-12-20 NOTE — TELEPHONE ENCOUNTER
Psychiatry Cross Coverage - Rx Refill note    I received request for prescription refills of lithium and quetiapine, as Ms. Altman's prior psychiatric prescriber is no longer with this clinic.    I reviewed portions of her medical record, including most recent psychiatric progress note by ANGIE Sheets, CNP, of 8/3/2021; and reviewed 7/2/2021 lab results.    Plan:  I signed one month orders without additional refills of lithium carb 300mg, #90, sig 3 caps po at bedtime; and quetiapine 25mg, #30, sig 1 tab po at bedtime.    [facility] nursing staff is sending list of community psychiatrists/clinics that Ms. Altman will check into to transfer her psychiatric treatment. She is to contact our clinic if she has worsened symptoms, medication side effects, safety concerns or difficulty arranging for transfer of psychiatric treatment.     Joe Cameron MD

## 2021-12-20 NOTE — TELEPHONE ENCOUNTER
Spoke with pt who reports being unaware of Chandra's departure. She has been off lithium and quetiapine for 2 days. Denies changes in mood; sleep has been going fine but has been harder the last two days without medication.     Pt denies acute changes in mental health. She has recently noticed increased feels of sadness which she relates to situational life events. Her sister, who was her roommate, recently moved to [facility]. She denies suicidal thoughts or self-harm thoughts/behaviors.     Writer to send pt a list of community providers for ongoing care. Encouraged pt to start reaching out to new providers now as this will take time.     Refill requests sent to attending providers.

## 2021-12-23 ENCOUNTER — TELEPHONE (OUTPATIENT)
Dept: PSYCHIATRY | Facility: CLINIC | Age: 21
End: 2021-12-23
Payer: COMMERCIAL

## 2021-12-23 NOTE — TELEPHONE ENCOUNTER
Sarahyr called pt at [facility] to discuss additional options for transferring care within [place]. Unable to make contact with patient. Writer left dvm requesting callback to follow up on additional transfer options, with [phone] left as callback number. Anna Burrell, EMT

## 2022-01-25 DIAGNOSIS — F31.9 BIPOLAR I DISORDER (H): ICD-10-CM

## 2022-01-25 NOTE — TELEPHONE ENCOUNTER
[facility] Call Center    Phone Message    May a detailed message be left on voicemail: yes     Reason for Call: Medication Refill Request    Has the patient contacted the pharmacy for the refill? Yes   Name of medication being requested: Seroquel and Lithium  Provider who prescribed the medication: Chandra Herrera  Pharmacy:  [facility] [address] [place] [address] WAY  Date medication is needed: 1/27/22. Patient has 2 days left of medication, has not been successful in establishing anywhere else but is trying. Requested another month of refills while she tries to get established.         Action Taken: Message routed to: Catherine Stanton and Daren Mcclellan    Travel Screening: Not Applicable

## 2022-01-28 DIAGNOSIS — F31.9 BIPOLAR I DISORDER (H): ICD-10-CM

## 2022-01-28 RX ORDER — LITHIUM CARBONATE 300 MG/1
CAPSULE ORAL
Qty: 90 CAPSULE | Refills: 0 | OUTPATIENT
Start: 2022-01-28

## 2022-01-28 RX ORDER — QUETIAPINE FUMARATE 25 MG/1
TABLET, FILM COATED ORAL
Qty: 30 TABLET | Refills: 0 | OUTPATIENT
Start: 2022-01-28

## 2022-01-28 RX ORDER — QUETIAPINE FUMARATE 25 MG/1
25 TABLET, FILM COATED ORAL AT BEDTIME
Qty: 30 TABLET | Refills: 1 | Status: SHIPPED | OUTPATIENT
Start: 2022-01-28

## 2022-01-28 RX ORDER — LITHIUM CARBONATE 300 MG/1
900 CAPSULE ORAL AT BEDTIME
Qty: 90 CAPSULE | Refills: 1 | Status: SHIPPED | OUTPATIENT
Start: 2022-01-28

## 2022-01-28 NOTE — TELEPHONE ENCOUNTER
Last Seen 8/3  RTC 3 months  Cancel None  No-Show None    Next Appt Transferring care out of clinic    Incoming Refill From Patient via telephone    Medication Requested   lithium 300 MG capsule  Directions   Take 3 capsules (900 mg) by mouth At Bedtime  Qty 90  Last Refill Approx. 12/20    Medication Requested   QUEtiapine (SEROQUEL) 25 MG tablet  Directions   Take 1 tablet (25 mg) by mouth At Bedtime  Qty 30  Last Refill Approx. 12/20    Medication pended and routed to Doctor of the day

## 2022-03-31 DIAGNOSIS — F31.9 BIPOLAR I DISORDER (H): ICD-10-CM

## 2022-03-31 RX ORDER — LITHIUM CARBONATE 300 MG/1
CAPSULE ORAL
Qty: 90 CAPSULE | Refills: 0 | OUTPATIENT
Start: 2022-03-31

## 2022-04-02 DIAGNOSIS — F31.9 BIPOLAR I DISORDER (H): ICD-10-CM

## 2022-04-04 RX ORDER — LITHIUM CARBONATE 300 MG/1
CAPSULE ORAL
Qty: 90 CAPSULE | Refills: 0 | OUTPATIENT
Start: 2022-04-04

## 2022-05-16 ENCOUNTER — HEALTH MAINTENANCE LETTER (OUTPATIENT)
Age: 22
End: 2022-05-16

## 2022-09-11 ENCOUNTER — HEALTH MAINTENANCE LETTER (OUTPATIENT)
Age: 22
End: 2022-09-11

## 2022-09-23 NOTE — TELEPHONE ENCOUNTER
Received another incoming phone call from the pt. She is not able to complete the labs today and is wondering if she can complete tomorrow morning instead. The pt also asked if she could still see the provider this evening. Asked that she still come into clinic at 4:30 pm and that it would be okay for her to complete labs tomorrow morning. Reiterated that lithium lab draw should be completed 10-12 hours after nighttime dose. She voiced understanding. Pt also requested refills of her medications. Agreed to relay this to the provider.    Wear the sling  Contact Dr. Savage

## 2023-06-03 ENCOUNTER — HEALTH MAINTENANCE LETTER (OUTPATIENT)
Age: 23
End: 2023-06-03

## 2024-07-13 ENCOUNTER — HEALTH MAINTENANCE LETTER (OUTPATIENT)
Age: 24
End: 2024-07-13